# Patient Record
Sex: FEMALE | Race: OTHER
[De-identification: names, ages, dates, MRNs, and addresses within clinical notes are randomized per-mention and may not be internally consistent; named-entity substitution may affect disease eponyms.]

---

## 2019-03-12 ENCOUNTER — HOSPITAL ENCOUNTER (INPATIENT)
Dept: HOSPITAL 88 - ER | Age: 84
LOS: 10 days | Discharge: NURSING, REHAB OR LONG-TERM CARE | DRG: 871 | End: 2019-03-22
Attending: INTERNAL MEDICINE | Admitting: INTERNAL MEDICINE
Payer: MEDICARE

## 2019-03-12 VITALS — DIASTOLIC BLOOD PRESSURE: 64 MMHG | SYSTOLIC BLOOD PRESSURE: 152 MMHG

## 2019-03-12 VITALS — SYSTOLIC BLOOD PRESSURE: 152 MMHG | DIASTOLIC BLOOD PRESSURE: 64 MMHG

## 2019-03-12 VITALS — DIASTOLIC BLOOD PRESSURE: 77 MMHG | SYSTOLIC BLOOD PRESSURE: 143 MMHG

## 2019-03-12 DIAGNOSIS — Z74.01: ICD-10-CM

## 2019-03-12 DIAGNOSIS — K44.9: ICD-10-CM

## 2019-03-12 DIAGNOSIS — E87.2: ICD-10-CM

## 2019-03-12 DIAGNOSIS — E44.0: ICD-10-CM

## 2019-03-12 DIAGNOSIS — N39.0: ICD-10-CM

## 2019-03-12 DIAGNOSIS — Z88.0: ICD-10-CM

## 2019-03-12 DIAGNOSIS — Z88.2: ICD-10-CM

## 2019-03-12 DIAGNOSIS — R62.7: ICD-10-CM

## 2019-03-12 DIAGNOSIS — F02.80: ICD-10-CM

## 2019-03-12 DIAGNOSIS — K29.70: ICD-10-CM

## 2019-03-12 DIAGNOSIS — Z43.1: ICD-10-CM

## 2019-03-12 DIAGNOSIS — Z79.01: ICD-10-CM

## 2019-03-12 DIAGNOSIS — Z86.73: ICD-10-CM

## 2019-03-12 DIAGNOSIS — G30.9: ICD-10-CM

## 2019-03-12 DIAGNOSIS — A41.9: Primary | ICD-10-CM

## 2019-03-12 DIAGNOSIS — I48.91: ICD-10-CM

## 2019-03-12 DIAGNOSIS — Z91.012: ICD-10-CM

## 2019-03-12 DIAGNOSIS — J69.0: ICD-10-CM

## 2019-03-12 LAB
ALBUMIN SERPL-MCNC: 2.9 G/DL (ref 3.5–5)
ALBUMIN/GLOB SERPL: 0.9 {RATIO} (ref 0.8–2)
ALP SERPL-CCNC: 66 IU/L (ref 40–150)
ALT SERPL-CCNC: 13 IU/L (ref 0–55)
ANION GAP SERPL CALC-SCNC: 18.4 MMOL/L (ref 8–16)
BACTERIA URNS QL MICRO: (no result) /HPF
BASOPHILS # BLD AUTO: 0.1 10*3/UL (ref 0–0.1)
BASOPHILS NFR BLD AUTO: 0.4 % (ref 0–1)
BILIRUB UR QL: NEGATIVE
BUN SERPL-MCNC: 28 MG/DL (ref 7–26)
BUN/CREAT SERPL: 24 (ref 6–25)
CALCIUM SERPL-MCNC: 9.2 MG/DL (ref 8.4–10.2)
CHLORIDE SERPL-SCNC: 98 MMOL/L (ref 98–107)
CK MB SERPL-MCNC: 0.3 NG/ML (ref 0–5)
CK SERPL-CCNC: 44 IU/L (ref 29–168)
CLARITY UR: (no result)
CO2 SERPL-SCNC: 24 MMOL/L (ref 22–29)
COLOR UR: (no result)
DEPRECATED NEUTROPHILS # BLD AUTO: 14.7 10*3/UL (ref 2.1–6.9)
DEPRECATED RBC URNS MANUAL-ACNC: (no result) /HPF (ref 0–5)
EGFRCR SERPLBLD CKD-EPI 2021: 43 ML/MIN (ref 60–?)
EOSINOPHIL # BLD AUTO: 0 10*3/UL (ref 0–0.4)
EOSINOPHIL NFR BLD AUTO: 0.2 % (ref 0–6)
EPI CELLS URNS QL MICRO: (no result) /LPF
ERYTHROCYTE [DISTWIDTH] IN CORD BLOOD: 13.5 % (ref 11.7–14.4)
GLOBULIN PLAS-MCNC: 3.2 G/DL (ref 2.3–3.5)
GLUCOSE SERPLBLD-MCNC: 200 MG/DL (ref 74–118)
HCT VFR BLD AUTO: 41.7 % (ref 34.2–44.1)
HGB BLD-MCNC: 13.4 G/DL (ref 12–16)
KETONES UR QL STRIP.AUTO: NEGATIVE
LEUKOCYTE ESTERASE UR QL STRIP.AUTO: (no result)
LYMPHOCYTES # BLD: 0.4 10*3/UL (ref 1–3.2)
LYMPHOCYTES NFR BLD AUTO: 2.7 % (ref 18–39.1)
MCH RBC QN AUTO: 30.9 PG (ref 28–32)
MCHC RBC AUTO-ENTMCNC: 32.1 G/DL (ref 31–35)
MCV RBC AUTO: 96.1 FL (ref 81–99)
MONOCYTES # BLD AUTO: 0.9 10*3/UL (ref 0.2–0.8)
MONOCYTES NFR BLD AUTO: 5.2 % (ref 4.4–11.3)
NEUTS SEG NFR BLD AUTO: 90.8 % (ref 38.7–80)
NITRITE UR QL STRIP.AUTO: NEGATIVE
NON-SQ EPI CELLS URNS QL MICRO: (no result)
PLATELET # BLD AUTO: 226 X10E3/UL (ref 140–360)
POTASSIUM SERPL-SCNC: 3.4 MMOL/L (ref 3.5–5.1)
PROT UR QL STRIP.AUTO: NEGATIVE
RBC # BLD AUTO: 4.34 X10E6/UL (ref 3.6–5.1)
SODIUM SERPL-SCNC: 137 MMOL/L (ref 136–145)
SP GR UR STRIP: 1.01 (ref 1.01–1.02)
UROBILINOGEN UR STRIP-MCNC: 0.2 MG/DL (ref 0.2–1)
WBC #/AREA URNS HPF: (no result) /HPF (ref 0–5)

## 2019-03-12 PROCEDURE — 97139 UNLISTED THERAPEUTIC PX: CPT

## 2019-03-12 PROCEDURE — 93005 ELECTROCARDIOGRAM TRACING: CPT

## 2019-03-12 PROCEDURE — 84484 ASSAY OF TROPONIN QUANT: CPT

## 2019-03-12 PROCEDURE — 82553 CREATINE MB FRACTION: CPT

## 2019-03-12 PROCEDURE — 87086 URINE CULTURE/COLONY COUNT: CPT

## 2019-03-12 PROCEDURE — 74230 X-RAY XM SWLNG FUNCJ C+: CPT

## 2019-03-12 PROCEDURE — 87186 SC STD MICRODIL/AGAR DIL: CPT

## 2019-03-12 PROCEDURE — 99284 EMERGENCY DEPT VISIT MOD MDM: CPT

## 2019-03-12 PROCEDURE — 83605 ASSAY OF LACTIC ACID: CPT

## 2019-03-12 PROCEDURE — 82550 ASSAY OF CK (CPK): CPT

## 2019-03-12 PROCEDURE — 81001 URINALYSIS AUTO W/SCOPE: CPT

## 2019-03-12 PROCEDURE — 36415 COLL VENOUS BLD VENIPUNCTURE: CPT

## 2019-03-12 PROCEDURE — 71045 X-RAY EXAM CHEST 1 VIEW: CPT

## 2019-03-12 PROCEDURE — 87040 BLOOD CULTURE FOR BACTERIA: CPT

## 2019-03-12 PROCEDURE — 80053 COMPREHEN METABOLIC PANEL: CPT

## 2019-03-12 PROCEDURE — 87070 CULTURE OTHR SPECIMN AEROBIC: CPT

## 2019-03-12 PROCEDURE — 70450 CT HEAD/BRAIN W/O DYE: CPT

## 2019-03-12 PROCEDURE — 85025 COMPLETE CBC W/AUTO DIFF WBC: CPT

## 2019-03-12 PROCEDURE — 80048 BASIC METABOLIC PNL TOTAL CA: CPT

## 2019-03-12 PROCEDURE — 87205 SMEAR GRAM STAIN: CPT

## 2019-03-12 PROCEDURE — 43246 EGD PLACE GASTROSTOMY TUBE: CPT

## 2019-03-12 PROCEDURE — 83735 ASSAY OF MAGNESIUM: CPT

## 2019-03-12 PROCEDURE — 94640 AIRWAY INHALATION TREATMENT: CPT

## 2019-03-12 PROCEDURE — 82948 REAGENT STRIP/BLOOD GLUCOSE: CPT

## 2019-03-12 RX ADMIN — Medication SCH ML: at 23:00

## 2019-03-12 RX ADMIN — CLINDAMYCIN PHOSPHATE SCH MLS/HR: 18 INJECTION, SOLUTION INTRAVENOUS at 00:55

## 2019-03-12 NOTE — NUR
DIDI FROM LAB CALLED TO REPORT CRITICAL LACTIC ACID. INFORMED DR. ARNOLD AS 
WELL AS DANIEL RN PRIMARY NURSE.

## 2019-03-12 NOTE — NUR
RECEIVED ORDERS FOR ONE MORE LITER BOLUS R/T LACTIC = 53.9 AND TO REPEAT LACTIC 
EVERY 4 HOURS UNTIL NORMAL.  ALSO ORDER RECEIVED TO CHANGE PTS STATUS TO ICU.  
SUPERVISOR NOTIFIED.

## 2019-03-12 NOTE — XMS REPORT
Clinical Summary

                             Created on: 2019



Vianca Jarquin

External Reference #: [id number]

: 1935

Sex: Female



Demographics







                          Address                   [address] 

[place]  [address]

 

                          Home Phone                [phone]

 

                          Preferred Language        English

 

                          Marital Status            

 

                          Temple Affiliation     Unknown

 

                          Race                      White

 

                          Ethnic Group              Non-





Author







                          Author                    [facility]

 

                          Organization              [facility]

 

                          Address                   Unknown

 

                          Phone                     Unavailable







Support







                Name            Relationship    Address         Phone

 

                BackYasmany dunham    [employer]            Unknown         [phone]

 

                Michael Jarquin    [employer]            Unknown         [phone]







Care Team Providers







                    Care Team Member Name    Role                Phone

 

                    Michael Edmond MD    PCP                 [phone]







Allergies







                                        Comments



                 Active Allergy     Reactions       Severity        Noted Date 

 

                                         



                           Lactase                   2018 

 

                                         



                           Egg                       2018 

 

                                         



                           Penicillin G              2018 

 

                                         



                           Sulfa (Sulfonamide        2018 



                                         Antibiotics)    







Medications







                          End Date                  Status



              Medication     Sig          Dispensed     Refills      Start  



                                         Date  

 

                                                    Active



                     bisacodyl (DULCOLAX) 10     Insert 10 mg        0   



                           mg suppository            into the     



                                         rectum daily     



                                         as needed for     



                                         constipation.     

 

                                                    Active



                     lactulose 10 gram/15 mL     Take 20 g by        0   



                           (15 mL) solution          mouth daily.     

 

                                                    Active



                     multivitamin with     Take 1 tablet       0   



                           minerals tablet           by mouth     



                                         daily.     

 

                                                    Active



                     levothyroxine (SYNTHROID,     Take 100 mcg        0   



                           LEVOXYL) 100 mcg tablet     by mouth     



                                         daily.     

 

                                                    Active



                     acetaminophen (TYLENOL)     Take 650 mg         0   



                           325 MG tablet             by mouth     



                                         every 6 (six)     



                                         hours as     



                                         needed for     



                                         fever.     

 

                          2018                Discontinued



                     albuterol (ACCUNEB) 2.5     Take 2.5 mg         0   



                           mg /3 mL (0.083 %)        by     



                           nebulizer solution        nebulization     



                                         every 6 (six)     



                                         hours as     



                                         needed for     



                                         wheezing.     

 

                          2018                Discontinued



                     thiamine 100 MG tablet     Take 100 mg         0   



                                         by mouth     



                                         daily.     

 

                          2018                



              metoprolol tartrate     1 tablet (25     60 tablet     0              



                     (LOPRESSOR) 25 mg tablet     mg total) by        8  



                                         g-tube route     



                                         2 (two) times     



                                         a day for 30     



                                         days.     

 

                          2018                



              rivaroxaban (XARELTO) 15     Take 1 tablet     30 tablet     0              



                     mg tablet           (15 mg total)       8  



                                         by mouth     



                                         daily for 30     



                                         days.     

 

                          2018                



              predniSONE (DELTASONE) 10     Take 1 tablet     30 tablet     0              



                     mg tablet           (10 mg total)       8  



                                         by mouth     



                                         daily for 30     



                                         days.     

 

                          2018                



              nortriptyline (PAMELOR)     1 capsule (50     30 capsule     0              



                     50 MG capsule       mg total) by        8  



                                         g-tube route     



                                         nightly for     



                                         30 days.     







Active Problems







 



                           Problem                   Noted Date

 

 



                           Hypoxia                   2018

 

 



                           Altered mental status, unspecified     2018

 

 



                           Altered mental status     2018







Encounters







                          Care Team                 Description



                     Date                Type                Specialty  

 

                                        



Cherri Diaz MD Demmler, Michael RUCKER MD                 Hypoxia (Primary Dx); 

Sepsis, due to unspecified organism



                     2018          Hospital            General Internal Medicine  



                           -                         Encounter   



                                         2018    

 

                                        



Cherri Diaz MD                  PEG tube malfunction (Primary Dx); 

Generalized abdominal pain



                     2018          Emergency           Emergency Medicine  



after 2018



Social History







                                        Date



                 Tobacco Use     Types           Packs/Day       Years Used 

 

                                         



                                         Unknown If Ever Smoked    

 

    



                                         Smokeless Tobacco: Never   



                                         Used   









   



                 Alcohol Use     Drinks/Week     oz/Week         Comments

 

   



                                         No   









 



                           Sex Assigned at Birth     Date Recorded

 

 



                                         Not on file 









                                        Industry



                           Job Start Date            Occupation 

 

                                        Not on file



                           Not on file               Not on file 









                                        Travel End



                           Travel History            Travel Start 

 





                                         No recent travel history available.







Last Filed Vital Signs







                                        Time Taken



                           Vital Sign                Reading 

 

                                        2018 11:35 AM CDT



                           Blood Pressure            127/78 

 

                                        2018 11:35 AM CDT



                           Pulse                     110 

 

                                        2018 11:35 AM CDT



                           Temperature               36.7 C (98.1 F) 

 

                                        2018 11:35 AM CDT



                           Respiratory Rate          18 

 

                                        2018 11:35 AM CDT



                           Oxygen Saturation         100% 

 

                                        -



                           Inhaled Oxygen            - 



                                         Concentration  

 

                                        2018  9:17 AM CDT



                           Weight                    45.4 kg (100 lb) 

 

                                        2018  8:13 AM CDT



                           Height                    157.5 cm (5' 2") 

 

                                        2018  9:17 AM CDT



                           Body Mass Index           18.29 







Plan of Treatment







   



                 Health Maintenance     Due Date        Last Done       Comments

 

   



                           SHINGLES VACCINES (#1)     1986  

 

   



                           65+ PNEUMOCOCCAL VACCINE     2001  



                                         (1 of 2 - PCV13)   

 

   



                           PNEUMOCOCCAL              2001  



                                         POLYSACCHARIDE VACCINE   



                                         AGE 65 AND OVER   

 

   



                           INFLUENZA VACCINE         2018  







Procedures







                                        Comments



                 Procedure Name     Priority        Date/Time       Associated Diagnosis 

 

                                        



Results for this procedure are in the results section.



                     POC GLUCOSE         Routine             2018  



                                         2:21 PM CDT  

 

                                        



Results for this procedure are in the results section.



                     POC GLUCOSE         Routine             2018  



                                         9:50 AM CDT  

 

                                        



Results for this procedure are in the results section.



                     XR CHEST 1 VW PORTABLE     Routine             2018  



                                         9:45 AM CDT  

 

                                        



Results for this procedure are in the results section.



                     ZZESTIMATED GFR     Routine             2018  



                                         7:40 AM CDT  

 

                                        



Results for this procedure are in the results section.



                     PHOSPHORUS LEVEL     Routine             2018  



                                         7:40 AM CDT  

 

                                        



Results for this procedure are in the results section.



                     HC COMPLETE BLD COUNT     Routine             2018  



                           W/AUTO DIFF               7:40 AM CDT  

 

                                        



Results for this procedure are in the results section.



                     BASIC METABOLIC PANEL     Routine             2018  



                                         7:40 AM CDT  

 

                                        



Results for this procedure are in the results section.



                     POC GLUCOSE         Routine             2018  



                                         5:18 AM CDT  

 

                                        



Results for this procedure are in the results section.



                     POC GLUCOSE         Routine             2018  



                                         1:33 AM CDT  

 

                                        



Results for this procedure are in the results section.



                     POC GLUCOSE         Routine             2018  



                                         9:30 PM CDT  

 

                                        



Results for this procedure are in the results section.



                     POC GLUCOSE         Routine             2018  



                                         4:19 PM CDT  

 

                                        



Results for this procedure are in the results section.



                     POC GLUCOSE         Routine             2018  



                                         1:12 PM CDT  

 

                                        



Results for this procedure are in the results section.



                     POC GLUCOSE         Routine             2018  



                                         8:59 AM CDT  

 

                                        



Results for this procedure are in the results section.



                     XR CHEST 1 VW PORTABLE     Routine             2018  



                                         6:40 AM CDT  

 

                                        



Results for this procedure are in the results section.



                     ARTERIAL BLOOD GAS     Routine             2018  



                                         5:01 AM CDT  

 

                                        



Results for this procedure are in the results section.



                     ZZESTIMATED GFR     Routine             2018  



                                         3:20 AM CDT  

 

                                        



Results for this procedure are in the results section.



                     LACTIC ACID LEVEL     Routine             2018  



                                         3:20 AM CDT  

 

                                        



Results for this procedure are in the results section.



                     PHOSPHORUS LEVEL     Routine             2018  



                                         3:20 AM CDT  

 

                                        



Results for this procedure are in the results section.



                     HC COMPLETE BLD COUNT     Routine             2018  



                           W/AUTO DIFF               3:20 AM CDT  

 

                                        



Results for this procedure are in the results section.



                     BASIC METABOLIC PANEL     Routine             2018  



                                         3:20 AM CDT  

 

                                        



Results for this procedure are in the results section.



                     POC GLUCOSE         Routine             2018  



                                         8:52 PM CDT  

 

                                        



Results for this procedure are in the results section.



                     LACTIC ACID LEVEL     Timed               2018  



                                         6:37 PM CDT  

 

                                        



Results for this procedure are in the results section.



                     POC GLUCOSE         Routine             2018  



                                         3:49 PM CDT  

 

                                        



Results for this procedure are in the results section.



                     LACTIC ACID LEVEL     Timed               2018  



                                         2:55 PM CDT  

 

                                        



Results for this procedure are in the results section.



                     POC GLUCOSE         Routine             2018  



                                         11:47 AM CDT  

 

                                        



Results for this procedure are in the results section.



                     LACTIC ACID LEVEL     Timed               2018  



                                         8:37 AM CDT  

 

                                        



Results for this procedure are in the results section.



                     POC GLUCOSE         Routine             2018  



                                         7:20 AM CDT  

 

                                        



Results for this procedure are in the results section.



                     XR CHEST 1 VW PORTABLE     Routine             2018  



                                         6:29 AM CDT  

 

                                        



Results for this procedure are in the results section.



                     POC GLUCOSE         Routine             2018  



                                         4:59 AM CDT  

 

                                        



Results for this procedure are in the results section.



                     PHOSPHORUS LEVEL     Routine             2018  



                                         4:16 AM CDT  

 

                                        



Results for this procedure are in the results section.



                     ZZESTIMATED GFR     Routine             2018  



                                         4:16 AM CDT  

 

                                        



Results for this procedure are in the results section.



                     MAGNESIUM LEVEL     Routine             2018  



                                         4:16 AM CDT  

 

                                        



Results for this procedure are in the results section.



                     HC COMPLETE BLD COUNT     Routine             2018  



                           W/AUTO DIFF               4:16 AM CDT  

 

                                        



Results for this procedure are in the results section.



                     BASIC METABOLIC PANEL     Routine             2018  



                                         4:16 AM CDT  

 

                                        



Results for this procedure are in the results section.



                     HEPATIC FUNCTION PANEL     Routine             2018  



                                         4:16 AM CDT  

 

                                        



Results for this procedure are in the results section.



                     ARTERIAL BLOOD GAS     Routine             2018  



                                         4:05 AM CDT  

 

                                        



Results for this procedure are in the results section.



                     POC GLUCOSE         Routine             2018  



                                         1:54 AM CDT  

 

                                        



Results for this procedure are in the results section.



                     ZZESTIMATED GFR     Timed               2018  



                                         8:56 PM CDT  

 

                                        



Results for this procedure are in the results section.



                     BASIC METABOLIC PANEL     Timed               2018  



                                         8:56 PM CDT  

 

                                        



Results for this procedure are in the results section.



                     TROPONIN            Timed               2018  



                                         8:56 PM CDT  

 

                                        



Results for this procedure are in the results section.



                     LACTIC ACID LEVEL     Timed               2018  



                                         8:56 PM CDT  

 

                                        



Results for this procedure are in the results section.



                     POC GLUCOSE         Routine             2018  



                                         8:49 PM CDT  

 

                                        



Results for this procedure are in the results section.



                     RESPIRATORY PATHOGEN     Routine             2018  



                           PANEL                     6:05 PM CDT  

 

                                        



Results for this procedure are in the results section.



                     POC GLUCOSE         Routine             2018  



                                         4:08 PM CDT  

 

                                        



Results for this procedure are in the results section.



                     LACTIC ACID LEVEL, SEPSIS     Timed               2018  



                           - NOW AND REPEAT 2X EVERY      2:40 PM CDT  



                                         3 HOURS    

 

                                        



Results for this procedure are in the results section.



                     TROPONIN            Timed               2018  



                                         2:40 PM CDT  

 

                                        



Results for this procedure are in the results section.



                     ECHOCARDIOGRAM 2D     Routine             2018  



                           COMPLETE W MMODE SPECTRAL      1:56 PM CDT  



                                         COLOR DOPPLER (33970)    

 

                                        



Results for this procedure are in the results section.



                     POC GLUCOSE         Routine             2018  



                                         12:34 PM CDT  

 

                                        



Results for this procedure are in the results section.



                     CT ANGIOGRAM PE CHEST     STAT                2018  



                                         12:11 PM CDT  

 

                                        



Results for this procedure are in the results section.



                     CT HEAD WO CONTRAST     STAT                2018  



                                         12:11 PM CDT  

 

                                        



Results for this procedure are in the results section.



                     ARTERIAL BLOOD GAS     Routine             2018  



                                         10:40 AM CDT  

 

                                        



Results for this procedure are in the results section.



                     BLOOD CULTURE, AEROBIC &     Routine             2018  



                           ANAEROBIC                 9:56 AM CDT  

 

                                        



Results for this procedure are in the results section.



                     XR CHEST 1 VW PORTABLE     STAT                2018  



                                         9:54 AM CDT  

 

                                        



Results for this procedure are in the results section.



                     ZZESTIMATED GFR     STAT                2018  



                                         9:51 AM CDT  

 

                                        



Results for this procedure are in the results section.



                     B NATRIURETIC PEPTIDE     STAT                2018  



                                         9:51 AM CDT  

 

                                        



Results for this procedure are in the results section.



                     TROPONIN            STAT                2018  



                                         9:51 AM CDT  

 

                                        



Results for this procedure are in the results section.



                     PROTHROMBIN TIME WITH INR     STAT                2018  



                                         9:51 AM CDT  

 

                                        



Results for this procedure are in the results section.



                     LACTIC ACID LEVEL, SEPSIS     STAT                2018  



                           - NOW AND REPEAT 2X EVERY      9:51 AM CDT  



                                         3 HOURS    

 

                                        



Results for this procedure are in the results section.



                     COMPREHENSIVE METABOLIC     STAT                2018  



                           PANEL                     9:51 AM CDT  

 

                                        



Results for this procedure are in the results section.



                     CBC WITH PLATELET AND     STAT                2018  



                           DIFFERENTIAL              9:51 AM CDT  

 

                                        



Results for this procedure are in the results section.



                     BLOOD CULTURE, AEROBIC &     Routine             2018  



                           ANAEROBIC                 9:51 AM CDT  

 

                                        



Results for this procedure are in the results section.



                     URINALYSIS SCREEN AND     STAT                2018  



                           MICROSCOPY, WITH REFLEX      9:50 AM CDT  



                                         TO CULTURE    

 

                                        



Results for this procedure are in the results section.



                     GRAM STAIN          STAT                2018  



                                         9:50 AM CDT  

 

                                        



Results for this procedure are in the results section.



                     URINE CULTURE       STAT                2018  



                                         9:50 AM CDT  

 

                                        



Results for this procedure are in the results section.



                     ECG ED PRELIMINARY     Routine             2018  



                           INTERPRETATION            9:41 AM CDT  

 

                                        



Results for this procedure are in the results section.



                     ECG 12-LEAD         STAT                2018  



                                         9:38 AM CDT  

 

                                        



Results for this procedure are in the results section.



                     INTUBATION          Routine             2018  



                                         8:58 AM CDT  

 

                                        



Results for this procedure are in the results section.



                     XR ABDOMEN 1 VW PORTABLE     STAT                2018  



                                         8:51 AM CDT  

 

                                        



Results for this procedure are in the results section.



                     LA CHANGE GASTROSTOMY     Routine             2018  



                           TUBE PERCUTANEOUS W/O      8:13 AM CDT  



                                         GUIDE    



after 2018



Results

* POC glucose (2018  2:21 PM CDT)



Only the most recent of 17 results within the time period is included.





   



                 Component       Value           Ref Range       Performed At

 

   



                 POC glucose     217 (H)         65 - 100 mg/dL     [facility] DEPARTMENT OF



                           Comment:                  PATHOLOGY AND



                           Meter ID: [id number]      GENOMIC MEDICINE



                                         : Dee Zapien  









   



                 Performing Organization     Address         City/State/Zipcode     Phone Number

 

   



                     [facility] DEPARTMENT OF     [address] [place] [address] 



                                         PATHOLOGY AND GENOMIC   



                                         MEDICINE   





* XR Chest 1 Vw Portable (2018  9:45 AM CDT)



Only the most recent of 4 results within the time period is included.





 



                           Narrative                 Performed At

 

 



                           EXAMINATION: XR CHEST 1 VW PORTABLE     [facility]



                                         INDICATION: Respiratory Failure or Arrest 



                                         COMPARISON: Most recent prior. Prior chest and abdominal CTs. 



                                         IMPRESSION: 



                                         Stable appearance of the heart and mediastinum. 



                                         Arteriosclerosis and tortuous thoracic aorta. 



                                         Hypoventilation of the lungs with bibasilar opacities likely representing 



                                         atelectasis. 



                                         Stable calcified granuloma right lower lobe. 



                                         No visible effusion or pneumothorax. 



                                         Stable calcified mass in the left upper quadrant is consistent with a chronic 



                                         left adrenal hematoma. 



                                         [facility]-[id number] 









                                        Procedure Note

 

                                        



Hm Interface, Radiology Results Incoming - 2018  9:53 AM CDT



EXAMINATION: XR CHEST 1 VW PORTABLE



INDICATION: Respiratory Failure or Arrest



COMPARISON: Most recent prior. Prior chest and abdominal CTs.



IMPRESSION:



Stable appearance of the heart and mediastinum.

Arteriosclerosis and tortuous thoracic aorta.

Hypoventilation of the lungs with bibasilar opacities likely representing 
atelectasis.

Stable calcified granuloma right lower lobe.

No visible effusion or pneumothorax.

Stable calcified mass in the left upper quadrant is consistent with a chronic 
left adrenal hematoma.



[facility]-[id number]











   



                 Performing Organization     Address         City/[facility]/Zipcode     Phone Number

 

   



                     [facility]          [address] [place] [address] 





* Estimated GFR (2018  7:40 AM CDT)



Only the most recent of 5 results within the time period is included.





   



                 Component       Value           Ref Range       Performed At

 

   



                 GFR Non Af Amer     >90             mL/min/1.73 m2     [facility] DEPARTMENT OF



                                         PATHOLOGY AND



                                         GENOMIC MEDICINE

 

   



                 GFR Af Amer     >90             mL/min/1.73 m2     [facility] DEPARTMENT OF



                           Comment:                  PATHOLOGY AND



                           Chronic kidney disease: <60      GENOMIC MEDICINE



                                         mL/min/1.73m2  



                                         Kidney failure: <15  



                                         mL/min/1.73m2  



                                         The estimated GFR is  



                                         calculated from the  



                                         IDMS-traceable Modification of  



                                         Diet  



                                         in Renal Disease Equation. The  



                                         accuracy of the calculation is  



                                         poor when the  



                                         creatinine is normal.  



                                         Calculated values >90  



                                         mL/min/1.73m2 are not  



                                         reported.  



                                         This equation has not been  



                                         validated in children (<18  



                                         years), pregnant  



                                         women, the elderly (>70  



                                         years), or ethnic groups other  



                                         than Caucasians and  



                                          Americans.  













                                         Specimen

 





                                         Plasma specimen









   



                 Performing Organization     Address         City/State/Zipcode     Phone Number

 

   



                     [facility] DEPARTMENT [address] [place] [address] 



                                         PATHOLOGY AND GENOMIC   



                                         MEDICINE   





* CBC with platelet and differential (2018  7:40 AM CDT)



Only the most recent of 4 results within the time period is included.





   



                 Component       Value           Ref Range       Performed At

 

   



                 WBC             7.4             4.2 - 11.0 k/uL     [facility] DEPARTMENT OF



                                         PATHOLOGY AND



                                         GENOMIC MEDICINE

 

   



                 RBC             4.27            4.04 - 5.86 m/uL     [facility] DEPARTMENT OF



                                         PATHOLOGY AND



                                         GENOMIC MEDICINE

 

   



                 HGB             12.0            11.5 - 15.3 g/dL     [facility] DEPARTMENT OF



                                         PATHOLOGY AND



                                         GENOMIC MEDICINE

 

   



                 HCT             37.5            34.0 - 45.0 %     [facility] DEPARTMENT OF



                                         PATHOLOGY AND



                                         GENOMIC MEDICINE

 

   



                 MCV             87.8            80.0 - 98.0 fL     [facility] DEPARTMENT OF



                                         PATHOLOGY AND



                                         GENOMIC MEDICINE

 

   



                 MCH             28.1            27.0 - 34.0 pg     [facility] DEPARTMENT OF



                                         PATHOLOGY AND



                                         GENOMIC MEDICINE

 

   



                 MCHC            32.0            31.5 - 36.5 g/dL     [facility] DEPARTMENT OF



                                         PATHOLOGY AND



                                         GENOMIC MEDICINE

 

   



                 RDW - SD        52.9 (H)        37.0 - 51.0 fL     [facility] DEPARTMENT OF



                                         PATHOLOGY AND



                                         GENOMIC MEDICINE

 

   



                 MPV             10.5 (H)        7.4 - 10.4 fL     [facility] DEPARTMENT OF



                                         PATHOLOGY AND



                                         GENOMIC MEDICINE

 

   



                 Platelet count     231             150 - 400 k/uL     [facility] DEPARTMENT 



                                         PATHOLOGY AND



                                         GENOMIC MEDICINE

 

   



                 Nucleated RBC     0.00            /100 WBC        [facility] DEPARTMENT OF



                                         PATHOLOGY AND



                                         GENOMIC MEDICINE

 

   



                 Neutrophils     76.0 (H)        36.0 - 66.0 %     [facility] DEPARTMENT OF



                                         PATHOLOGY AND



                                         GENOMIC MEDICINE

 

   



                 Lymphocytes     15.0 (L)        24.0 - 44.0 %     [facility] DEPARTMENT OF



                                         PATHOLOGY AND



                                         GENOMIC MEDICINE

 

   



                 Monocytes       7.8 (H)         0.0 - 6.0 %     [facility] DEPARTMENT OF



                                         PATHOLOGY AND



                                         GENOMIC MEDICINE

 

   



                 Eosinophils     0.5             0.0 - 6.0 %     [facility] DEPARTMENT 



                                         PATHOLOGY AND



                                         GENOMIC MEDICINE

 

   



                 Basophils       0.4             0.0 - 1.2 %     [facility]



                                         PATHOLOGY AND



                                         GENOMIC MEDICINE

 

   



                 Immature granulocytes     0.3             0.0 - 1.0 %     [facility] DEPARTMENT OF



                                         PATHOLOGY AND



                                         GENOMIC MEDICINE













                                         Specimen

 





                                         Blood









   



                 Performing Organization     Address         City/[facility]     Phone Number

 

   



                     [place] [address] 



                                         PATHOLOGY [facility]   





* Phosphorus level (2018  7:40 AM CDT)



Only the most recent of 3 results within the time period is included.





   



                 Component       Value           Ref Range       Performed At

 

   



                 Phosphorus      1.8 (L)         2.4 - 4.5 mg/dL     [facility]



                                         PATHOLOGY AND



                                         GENOMIC MEDICINE













                                         Specimen

 





                                         Plasma specimen









   



                 Performing Organization     Address         City/[facility] Cent[facility]     Phone Number

 

   



                     [place] [address] 



                                         PATHOLOGY [facility]   





* Basic metabolic panel (2018  7:40 AM CDT)



Only the most recent of 4 results within the time period is included.





   



                 Component       Value           Ref Range       Performed At

 

   



                 Sodium          138             135 - 150 mEq/L     [facility] DEPARTMENT OF



                                         PATHOLOGY AND



                                         GENOMIC MEDICINE

 

   



                 Potassium       4.1             3.5 - 5.0 mEq/L     [facility] DEPARTMENT OF



                                         PATHOLOGY AND



                                         GENOMIC MEDICINE

 

   



                 Chloride        100             98 - 112 mEq/L     [facility] DEPARTMENT OF



                                         PATHOLOGY AND



                                         GENOMIC MEDICINE

 

   



                 CO2             24              24 - 31 mmol/L     [facility] DEPARTMENT OF



                                         PATHOLOGY AND



                                         GENOMIC MEDICINE

 

   



                 Anion gap       14@ANIO         7 - 15 mEq/L     [facility] DEPARTMENT OF



                                         PATHOLOGY AND



                                         GENOMIC MEDICINE

 

   



                 BUN             21 (H)          7 - 18 mg/dL     [facility] DEPARTMENT OF



                                         PATHOLOGY AND



                                         GENOMIC MEDICINE

 

   



                 Creatinine      0.60            0.50 - 0.90 mg/dL     [facility] DEPARTMENT OF



                                         PATHOLOGY AND



                                         GENOMIC MEDICINE

 

   



                 Glucose         168 (H)         65 - 100 mg/dL     [facility] DEPARTMENT OF



                                         PATHOLOGY AND



                                         GENOMIC MEDICINE

 

   



                 Calcium         9.6             8.8 - 10.2 mg/dL     [facility] DEPARTMENT OF



                                         PATHOLOGY AND



                                         GENOMIC MEDICINE













                                         Specimen

 





                                         Plasma specimen









   



                 Performing Organization     Address         City/State/Zipcode     Phone Number

 

   



                     [place] [address] [place] [address] 



                                         PATHOLOGY AND GENOMIC   



                                         MEDICINE   





* Arterial blood gas (2018  5:01 AM CDT)



Only the most recent of 3 results within the time period is included.





   



                 Component       Value           Ref Range       Performed At

 

   



                                 JSXB                [facility] DEPARTMENT OF



                                         PATHOLOGY AND



                                         GENOMIC MEDICINE

 

   



                     Collection site     LRA                 [facility] DEPARTMENT OF



                                         PATHOLOGY AND



                                         GENOMIC MEDICINE

 

   



                     O2 therapy          HFNC                [facility] DEPARTMENT OF



                                         PATHOLOGY AND



                                         GENOMIC MEDICINE

 

   



                 pH, arterial     7.580 (HH)      7.350 - 7.450 units     [facility] DEPARTMENT OF



                           Comment:                  PATHOLOGY AND



                           Results called to and read      GENOMIC MEDICINE



                                         back by AYE JUDGE  



                                         [id number] by  



                                         _IV_.  

 

   



                 pCO2, arterial     24.8 (LL)       35.0 - 45.0 mmHg     [facility] DEPARTMENT OF



                           Comment:                  PATHOLOGY AND



                           Results called to and read      GENOMIC MEDICINE



                                         back by AYE JUDGE  



                                         [id number] by  



                                         _IV_.  

 

   



                 pO2, arterial     176.0 (H)       80.0 - 90.0 mmHg     [facility] DEPARTMENT OF



                                         PATHOLOGY AND



                                         GENOMIC MEDICINE

 

   



                 O2 saturation, arterial     >100.0          95.0 - 100.0 %     [facility] DEPARTMENT OF



                                         PATHOLOGY AND



                                         GENOMIC MEDICINE

 

   



                 Base excess, arterial     1.3             mEq/L           [facility] DEPARTMENT OF



                                         PATHOLOGY AND



                                         GENOMIC MEDICINE

 

   



                 Bicarbonate     23.2            21.0 - 28.0 mEq/L     [facility] DEPARTMENT OF



                                         PATHOLOGY AND



                                         GENOMIC MEDICINE

 

   



                 O2 content      17.1            VOL%            [facility] DEPARTMENT OF



                                         PATHOLOGY AND



                                         GENOMIC MEDICINE

 

   



                 FiO2, inspired O2%     32.0            %               [facility] DEPARTMENT OF



                                         PATHOLOGY AND



                                         GENOMIC MEDICINE

 

   



                 Inspired O2 l/m     3               L/min           [facility] DEPARTMENT OF



                                         PATHOLOGY AND



                                         GENOMIC MEDICINE

 

   



                 Carboxyhemoglobin     0.2             0.0 - 1.4 %     [facility] DEPARTMENT OF



                           Comment:                  PATHOLOGY AND



                           Reference Ranges:         GENOMIC MEDICINE



                                         Carboxyhemoglobin  



                                         Non smoker: 0.0 - 2.0%  



                                         Smoker: 2.1 - 5.0%  



                                         Heavy smoker: 5.1 - 9%  

 

   



                 Methemoglobin     <0.0            0.0 - 1.0 %     [facility] DEPARTMENT OF



                                         PATHOLOGY AND



                                         GENOMIC MEDICINE

 

   



                 Hemoglobin, blood gas     12.0            12.0 - 16.0 g/dL     [facility] DEPARTMENT OF



                                         PATHOLOGY AND



                                         GENOMIC MEDICINE

 

   



                 pO2, A-a        24.5            mmHg            [facility] DEPARTMENT OF



                                         PATHOLOGY AND



                                         GENOMIC MEDICINE













                                         Specimen

 





                                         Blood









   



                 Performing Organization     Address         City/[facility] Centercode     Phone Number

 

   



                     [place] [address] 



                                         PATHOLOGY AND GENOMIC   



                                         MEDICINE   





* Lactic acid level (2018  3:20 AM CDT)



Only the most recent of 5 results within the time period is included.





   



                 Component       Value           Ref Range       Performed At

 

   



                 Lactic acid     1.8             0.5 - 2.2 mmol/L     [facility] DEPARTMENT OF



                                         PATHOLOGY AND



                                         GENOMIC MEDICINE













                                         Specimen

 





                                         Blood









   



                 Performing Organization     Address         City/[facility] Centerc[facility]     Phone Number

 

   



                     [place] [address] 



                                         PATHOLOGY AND GENOMIC   



                                         MEDICINE   





* Magnesium level (2018  4:16 AM CDT)





   



                 Component       Value           Ref Range       Performed At

 

   



                 Magnesium       1.80            1.60 - 2.40 mg/dL     [facility] DEPARTMENT OF



                                         PATHOLOGY AND



                                         GENOMIC MEDICINE













                                         Specimen

 





                                         Plasma specimen









   



                 Performing Organization     Address         City/[facility]/[place]     Phone Number

 

   



                     [place] [address] 



                                         PATHOLOGY AND GENOMIC   



                                         MEDICINE   





* Hepatic function panel (2018  4:16 AM CDT)





   



                 Component       Value           Ref Range       Performed At

 

   



                 Albumin         2.6 (L)         3.5 - 5.0 g/dL     [facility] DEPARTMENT OF



                                         PATHOLOGY AND



                                         GENOMIC MEDICINE

 

   



                 Total bilirubin     0.4             0.2 - 1.2 mg/dL     [facility] DEPARTMENT OF



                                         PATHOLOGY AND



                                         GENOMIC MEDICINE

 

   



                 Bilirubin direct     <0.2            0.0 - 0.4 mg/dL     [facility] DEPARTMENT OF



                                         PATHOLOGY AND



                                         GENOMIC MEDICINE

 

   



                 Alkaline phosphatase     93              0 - 104 U/L     [facility] DEPARTMENT OF



                                         PATHOLOGY AND



                                         GENOMIC MEDICINE

 

   



                 Protein         6.8             6.3 - 8.3 g/dL     [facility] DEPARTMENT OF



                                         PATHOLOGY AND



                                         GENOMIC MEDICINE

 

   



                 ALT             15              5 - 50 U/L      [facility] DEPARTMENT OF



                                         PATHOLOGY AND



                                         GENOMIC MEDICINE

 

   



                 AST             32              10 - 35 U/L     [facility] DEPARTMENT OF



                                         PATHOLOGY AND



                                         GENOMIC MEDICINE













                                         Specimen

 





                                         Plasma specimen









   



                 Performing Organization     Address         City/[facility]/Zipcode     Phone Number

 

   



                     [address] [address] 



                                         PATHOLOGY AND GENOMIC   



                                         MEDICINE   





* Troponin (2018  8:56 PM CDT)



Only the most recent of 3 results within the time period is included.





   



                 Component       Value           Ref Range       Performed At

 

   



                 Troponin        <0.30           0.00 - 0.30 ng/mL     [facility] DEPARTMENT OF



                           Comment:                  PATHOLOGY AND



                           0.11 - 1.49               GENOMIC MEDICINE



                                         ng/mlMay  



                                         indicate increased risk of  



                                         acute  



                                           



                                          coronary  



                                         syndrome.  



                                           



                                           



                                         >=1.5  



                                         ng/ml  



                                         Consistent with acute  



                                         myocardial  



                                           



                                          infarction.  



                                           



                                           



                                           



                                           



                                           



                                         The diagnostic value of a  



                                         single normal or  



                                         non-diagnostic  



                                         result is  



                                         questionable.Serial  



                                         samples at 2-6 hour intervals  



                                         are required to rule out acute  



                                         myocardial  



                                         injury.  



                                           



                                           













                                         Specimen

 





                                         Plasma specimen









   



                 Performing Organization     Address         City/[facility]     Phone Number

 

   



                     [address] [place] [address] 



                                         PATHOLOGY AND GENOMIC   



                                         MEDICINE   





* Respiratory pathogen panel (2018  6:05 PM CDT)





   



                 Component       Value           Ref Range       Performed At

 

   



                     Respiratory pathogen     Positive for        [facility] DEPARTMENT OF



                     panel               Rhinovirus/Enterovirus      PATHOLOGY AND



                           -----------------------      GENOMIC MEDICINE



                                         Negative for all other  



                                         pathogens tested:  



                                         Negative for Adenovirus  



                                         Negative for Coronavirus HKU1  



                                         Negative for Coronavirus NL63  



                                         Negative for Coronavirus 229E  



                                         Negative for Coronavirus OC43  



                                         Negative for Human  



                                         Metapneumovirus  



                                         Negative for Influenza A  



                                         Negative for Influenza A/H1  



                                         Negative for Influenza A/H3  



                                         Negative for Influenza  



                                         A/H1-2009  



                                         Negative for Influenza B  



                                         Negative for Parainfluenza  



                                         Virus 1  



                                         Negative for Parainfluenza  



                                         Virus 2  



                                         Negative for Parainfluenza  



                                         Virus 3  



                                         Negative for Parainfluenza  



                                         Virus 4  



                                         Negative for Respiratory  



                                         Syncytial Virus  



                                         Negative for Bordetella  



                                         pertussis  



                                         Negative for Chlamydophila  



                                         pneumoniae  



                                         Negative for Mycoplasma  



                                         pneumoniae  



                                         This real-time PCR assay  



                                         detects the presence of  



                                         nucleic  



                                         acids (RNA or DNA) for the  



                                         respiratory pathogens  



                                         listed.  



                                         A result of "Not-detected"  



                                         does not exclude the  



                                         possibility  



                                         of the presence of one or more  



                                         pathogens at concentrations  



                                         less than the detectable  



                                         limits of the assa (A)  



                                         Comment:  



                                         Specimen Information  



                                         Specimen Source: Nares  



                                         Specimen Site: Right  













                                         Specimen

 





                                         Nares - Right









   



                 Performing Organization     Address         City/[facility]/Zipcode     Phone Number

 

   



                     [facility] DEPARTMENT OF     [address] [place] [address] 



                                         PATHOLOGY AND GENOMIC   



                                         MEDICINE   





* Lactic acid level, SEPSIS - Now and repeat 2x every 3 hours (2018  2:40 
  PM CDT)



Only the most recent of 2 results within the time period is included.





   



                 Component       Value           Ref Range       Performed At

 

   



                 Lactic acid     4.1 (HH)        0.5 - 2.2 mmol/L     [facility] DEPARTMENT OF



                           Comment:                  PATHOLOGY AND



                           Results called to and read      [facility] MEDICINE



                                         back by SOPHIA KWAN /  



                                         MEETA REYES NP  



                                         [id number] JGP  













                                         Specimen

 





                                         Blood









   



                 Performing Organization     Address         City/[facility]/Zipcode     Phone Number

 

   



                     [facility] DEPARTMENT OF     [address] [place] [address] 



                                         PATHOLOGY AND GENOMIC   



                                         MEDICINE   





* Echocardiogram complete w contrast and 3D if needed (2018  1:56 PM CDT)





   



                 Component       Value           Ref Range       Performed At

 

   



                 Velocity Ratio (V1/V2)     0.72            m/s             [facility]

 

   



                 IVS,d           1.15            0.6 - 1.2 cm     [facility]

 

   



                 EF              61.49           %               [facility]

 

   



                 LVPWD,d         1.09            cm              [facility]

 

   



                 AoV Mean PG     3.04            mmHg            [facility]

 

   



                 AV LVOT peak gradient     2.77            mmHg            [facility]

 

   



                 MV mean gradient     2.84            mmHg            [facility]

 

   



                 MV valve area p 1/2     6.90            cm2             [facility]



                                         method   

 

   



                 PV Pk Grad      3.60            mmHg            [facility]

 

   



                 E wave decelartion time     109.90          msec            [facility]

 

   



                 LVOT Diam,S     1.88            cm              [facility]

 

   



                 LVOT area       2.77            cm2             [facility]

 

   



                 LVOT Vmax       0.83            m/s             [facility]

 

   



                 LVOT VTI        0.14            m               [facility]

 

   



                 AoV Peak PG     5.27            mmHg            [facility]

 

   



                 MV Peak E Momo     1.15            m/s             [facility]

 

   



                 MV stenosis pressure 1/2     31.87           ms              [facility]



                                         time   

 

   



                 MV Peak A Momo     0.00            m/s             [facility]

 

   



                 Ao Root Diameter     2.84            cm              [facility]

 

   



                 AoV Area, Vmax     2.01            cm2             [facility]

 

   



                 AoV Area, VTI     1.79            cm2             [facility]

 

   



                 AoV Vmax        1.15            m/s             [facility]

 

   



                     IVS/LVPW,2D         1.06                [facility]

 

   



                 Left Atrium Dimension     3.19            cm              [facility]



                                         Anterior   

 

   



                 LV,d            3.88            cm              [facility]

 

   



                 LV,s            2.62            cm              [facility]

 

   



                 PV VMAX         0.95            m/s             [facility]

 

   



                 TR Vpeak        2.57            mm/s            [facility]

 

   



                 MV E A ratio     343.39          mmHg            [facility]

 

   



                 TR pk grad      23.92           mmHg            [facility]

 

   



                 MR peak grad     6.65            mmHg            [facility]

 

   



                 Ao Root Diameter     2.84            cm              [facility]

 

   



                 AR Press Half Time     482.24          ms              [facility]

 

   



                 LV SYS VOL      25.08           ml              [facility]

 

   



                 LV JASON VOL     65.13           ml              [facility]

 

   



                 LA area s A4C     10.30           cm2             [facility]

 

   



                 LA Vol MOD A4C     16.75           ml              [facility]

 

   



                 LV SV Teich 2D     40.05           ml              [facility]

 

   



                 LV Vol s Teich PSAX     25.08           ml              [facility]

 

   



                 LVOT CO         3.61            l/min           [facility]

 

   



                 LVOT HR for LVOT CO     92.19           bpm             [facility]

 

   



                 MV Vmax         1.29            m               [facility]

 

   



                 MV VTI Tips     0.18            m               [facility]

 

   



                     AoV Vmn             0.84                [facility]

 

   



                     AR slope            1.98                [facility]

 

   



                     Ar Vmax             3.30                [facility]

 

   



                     IVS s 2D            1.46                [facility]

 

   



                     LV FS Cube 2D       32.48               [facility]

 

   



                     LV FS Teich 2D      32.48               [facility]

 

   



                 AoV VTI         0.22            m               [facility]

 

   



                 LV EF,2D        69.22           %               [facility]

 

   



                     MV AE ratio         0.00                [facility]

 

   



                     LVOT Vmn            0.58                [facility]

 

   



                 Aov area Vmn     1.92            cm2             [facility]

 

   



                 LVOT mean grad     1.52            mmHg            [facility]

 

   



                     MAX Pred HR         138.13              [facility]

 

   



                     85 of MPHR          117.41              [facility]

 

   



                 AR DT           1,662.90        msec            [facility]

 

   



                 AR pk grad      43.51           mmHg            [facility]

 

   



                 Calc MPHR       138.13          bpm             [facility]

 

   



                 IVS pct thck PLAX     27.11           %               [facility]

 

   



                 LV SV Cube 2D     40.45           ml              [facility]

 

   



                 LV vol d cube 2D     58.44           ml              [facility]

 

   



                 LV vol s cube 2D     17.99           ml              [facility]

 

   



                 LVPW pct thck PLAX     32.48           %               [facility]

 

   



                 LVPW s PLAX     1.44            cm              [facility]

 

   



                 MV Decel slope     10.48           m/s2            [facility]

 

   



                     Pred Exer Dur R1     5.54                [facility]

 

   



                     Pred METS R1        4.06                [facility]









 



                           Narrative                 Performed At

 

 



                           This result has an attachment that is not available.     [facility]



                                          The left ventricle chamber size is normal. 



                                          Left Ventricular ejection fraction is 55 - 60%. 



                                          Trace aortic regurgitation. 



                                          Trace aortic regurgitation 









   



                 Performing Organization     Address         City/State/Zipcode     Phone Number

 

   



                      [facility]            [address] [place] [address] 





* CT Angiogram Pe Chest (2018 12:11 PM CDT)





 



                           Narrative                 Performed At

 

 



                           EXAMINATION: CT ANGIOGRAM PE CHEST      [facility]



                                         CLINICAL HISTORY: sob 



                                         TECHNIQUE:CT angiographic images of the chest are obtained during 



                                         intravenous administration of iodinated contrast. Computerized reformatted 



                                         images and 3-D images were also obtained and archived. CT pulmonary embolus 



                                         protocol. 



                                         CT scans are performed using radiation dose reduction techniques.Technical 





                                         factors are evaluated and adjusted to ensure appropriate moderation of 



                                         exposure.Automated dose management technology is applied to adjust radiation

 



                                         exposure while achieving a 



                                         diagnostic quality image. 



                                         COMPARISON: 2018 



                                         FINDINGS: The endotracheal tube and nasogastric tube are present. The thoracic 





                                         aorta has no aneurysmal dilatation. 



                                         The pulmonary arteries are well opacified. There is no evidence of any pulmonary

 



                                         embolism. The heart has no pericardial effusion. 



                                         The visualized portions of the liver, spleen are unremarkable. 



                                         There is a 6 cm calcified hematoma seen within the left adrenal gland. This was

 



                                         seen on prior studies and is unchanged. 



                                         The lung zones demonstrate mild emphysematous changes to be present. There is 



                                         mild compressive atelectasis seen at the lower lung bases. There is no pleural 





                                         effusion or pneumothorax. 



                                         IMPRESSION: 



                                         1. There is no evidence of any pulmonary embolism. 



                                         2. The thoracic aorta has no aneurysmal dilatation or dissection. Mild 



                                         atherosclerotic vascular changes are present. 



                                         3. The lung zones demonstrate emphysematous changes to be present. There is no 





                                         focal consolidation. 



                                         4. Compressive atelectasis is seen at the lower lung bases. 



                                         [facility]-[id number] 









                                        Procedure Note

 

                                        



 Interface, Radiology Results Incoming - 2018 12:25 PM CDT



EXAMINATION:   CT ANGIOGRAM PE CHEST



CLINICAL HISTORY:   sob



TECHNIQUE:  CT angiographic images of the chest are obtained during intravenous 
administration of iodinated contrast. Computerized reformatted images and 3-D 
images were also obtained and archived. CT pulmonary embolus protocol.



CT scans are performed using radiation dose reduction techniques.  Technical 
factors are evaluated and adjusted to ensure appropriate moderation of exposure.
 Automated dose management technology is applied to adjust radiation exposure 
while achieving a 

diagnostic quality image.



COMPARISON:   2018





FINDINGS: The endotracheal tube and nasogastric tube are present. The thoracic 
aorta has no aneurysmal dilatation.



The pulmonary arteries are well opacified. There is no evidence of any pulmonary
embolism. The heart has no pericardial effusion.



The visualized portions of the liver, spleen are unremarkable.



There is a 6 cm calcified hematoma seen within the left adrenal gland. This was 
seen on prior studies and is unchanged.



The lung zones demonstrate mild emphysematous changes to be present. There is 
mild compressive atelectasis seen at the lower lung bases. There is no pleural 
effusion or pneumothorax.







IMPRESSION:

                                        1. There is no evidence of any pulmonary embolism.

                                        2. The thoracic aorta has no aneurysmal dilatation or dissection. Mild atherosclerotic

 vascular changes are present.

                                        3. The lung zones demonstrate emphysematous changes to be present. There is no focal

 consolidation.

                                        4. Compressive atelectasis is seen at the lower lung bases.











[id number]











   



                 Performing Organization     Address         City/State/Zipcode     Phone Number

 

   



                     [facility]          [address] [place] [address] 





* CT Head Wo Contrast (2018 12:11 PM CDT)





 



                           Narrative                 Performed At

 

 



                           EXAMINATION: CT HEAD WO CONTRAST     [facility]



                                         CLINICAL HISTORY: HEADACHEACUTESEVERETHUNDERCLAPWORST LO OF LIFE

 



                                         COMPARISON:CT brain from 2018 



                                         TECHNIQUE: Noncontrast enhanced images of the brain were obtained from the skull

 



                                         base to the vertex. Both soft tissue and bone reconstruction algorithms were 



                                         performed.CT scans are performed using radiation dose reduction techniques.

 



                                         Technical factors 



                                         are evaluated and adjusted to ensure appropriate moderation of exposure. 



                                         Automated dose management technology is applied to adjust radiation exposure 



                                         while achieving a diagnostic quality image. 



                                         FINDINGS: 



                                         Artifacts obscure details. 



                                         There is no definite evidence of acute intracranial hemorrhage or mass, 



                                         hydrocephalus or midline shift, stroke or thrombus in the vessels. There are 



                                         relatively stable severe chronic changes in the brain including old infarcts in

 



                                         the cerebellum, left 



                                         parietal lobe, thalami, basal ganglia and possibly the gricelda. 



                                         There is an endotracheal tube and a nasogastric tube. 



                                         The orbits are unremarkable. There is mucosal thickening and partial 



                                         opacification of some of the sinuses. 



                                         IMPRESSION: 



                                         No acute intracranial abnormality identified. 



                                         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 



                                         Sinusitis which is improved compared with  year 



                                         [id number] 









                                        Procedure Note

 

                                        



 Interface, Radiology Results Incoming - 2018 12:16 PM CDT



EXAMINATION: CT HEAD WO CONTRAST



CLINICAL HISTORY: HEADACHE  ACUTE  SEVERE  THUNDERCLAP  WORST HA OF LIFE



COMPARISON:  CT brain from 2018



TECHNIQUE: Noncontrast enhanced images of the brain were obtained from the skull
base to the vertex. Both soft tissue and bone reconstruction algorithms were 
performed.  CT scans are performed using radiation dose reduction techniques. 
Technical factors 

are evaluated and adjusted to ensure appropriate moderation of exposure. 
Automated dose management technology is applied to adjust radiation exposure 
while achieving a diagnostic quality image.





FINDINGS:



Artifacts obscure details.



There is no definite evidence of acute intracranial hemorrhage or mass, 
hydrocephalus or midline shift, stroke or thrombus in the vessels. There are 
relatively stable severe chronic changes in the brain including old infarcts in 
the cerebellum, left 

parietal lobe, thalami, basal ganglia and possibly the gricelda.



There is an endotracheal tube and a nasogastric tube.



The orbits are unremarkable. There is mucosal thickening and partial 
opacification of some of the sinuses.





IMPRESSION:



No acute intracranial abnormality identified.



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



Sinusitis which is improved compared with January this year









[facility]-[id number]











   



                 Performing Organization     Address         City/[facility]     Phone Number

 

   



                     [facility]          [address] [place] [address] 





* Blood culture, aerobic & anaerobic (2018  9:56 AM CDT)



Only the most recent of 2 results within the time period is included.





   



                 Component       Value           Ref Range       Performed At

 

   



                     Blood culture isolate     No growth after 5 days of      [facility] DEPARTMENT OF



                           incubation.               PATHOLOGY AND



                           Comment:                  GENOMIC MEDICINE



                                         Specimen Information  



                                         Specimen Source: Blood  



                                         Specimen Site: Hand Right  













                                         Specimen

 





                                         Blood









   



                 Performing Organization     Address         City/[facility] Cent[facility]ode     Phone Number

 

   



                     [facility]     [address] [place] [address] 



                                         PATHOLOGY AND GENOMIC   



                                         MEDICINE   





* Prothrombin time with INR (2018  9:51 AM CDT)





   



                 Component       Value           Ref Range       Performed At

 

   



                 Prothrombin time     14.7            12.0 - 15.0 sec     [facility] DEPARTMENT OF



                                         PATHOLOGY AND



                                         GENOMIC MEDICINE

 

   



                 INR             1.13 (H)        0.92 - 1.12     [facility] DEPARTMENT OF



                           Comment:                  PATHOLOGY AND



                           For patients on anticoagulant      GENOMIC MEDICINE



                                         therapy, reference ranges  



                                         below:  



                                         Indication:  



                                           



                                         INR Value  



                                         Treatment of Venous  



                                         Thrombosis,  



                                          2.0-3.0  



                                         pulmonary emboli, or  



                                         prophylaxis  



                                         of a venous thrombosis, or  



                                         systemic  



                                         emboli.  



                                         High dose, high risk  



                                         patients  



                                          3.0-4.5  



                                         with mechanical valves.  



                                         NOTE:INR values over 3.0  



                                         are sometimes associated with  



                                         gastrointestinal hemorrhage,  



                                         especially values over 4.0.  













                                         Specimen

 





                                         Blood









   



                 Performing Organization     Address         City/[facility] Cent[facility]ode     Phone Number

 

   



                     [facility]     [address] [place] [address] 



                                         PATHOLOGY AND GENOMIC   



                                         MEDICINE   





* B natriuretic peptide (2018  9:51 AM CDT)





   



                 Component       Value           Ref Range       Performed At

 

   



                 BNP             214 (H)         0 - 100 pg/mL     [facility] DEPARTMENT OF



                                         PATHOLOGY AND



                                         GENOMIC MEDICINE













                                         Specimen

 





                                         Blood









   



                 Performing Organization     Address         City/[facility] Centercode     Phone Number

 

   



                     [place] [address] [place] [address] 



                                         PATHOLOGY AND GENOMIC   



                                         MEDICINE   





* Comprehensive metabolic panel (2018  9:51 AM CDT)





   



                 Component       Value           Ref Range       Performed At

 

   



                 Sodium          138             135 - 150 mEq/L     [facility] DEPARTMENT OF



                                         PATHOLOGY AND



                                         GENOMIC MEDICINE

 

   



                 Potassium       4.8             3.5 - 5.0 mEq/L     [facility] DEPARTMENT OF



                                         PATHOLOGY AND



                                         GENOMIC MEDICINE

 

   



                 Chloride        101             98 - 112 mEq/L     [facility] DEPARTMENT OF



                                         PATHOLOGY AND



                                         GENOMIC MEDICINE

 

   



                 CO2             21 (L)          24 - 31 mmol/L     [facility] DEPARTMENT OF



                                         PATHOLOGY AND



                                         GENOMIC MEDICINE

 

   



                 Anion gap       16@ANIO (H)     7 - 15 mEq/L     [facility] DEPARTMENT OF



                                         PATHOLOGY AND



                                         GENOMIC MEDICINE

 

   



                 BUN             30 (H)          7 - 18 mg/dL     [facility] DEPARTMENT OF



                                         PATHOLOGY AND



                                         GENOMIC MEDICINE

 

   



                 Creatinine      0.80            0.50 - 0.90 mg/dL     [facility] DEPARTMENT OF



                                         PATHOLOGY AND



                                         GENOMIC MEDICINE

 

   



                 Glucose         160 (H)         65 - 100 mg/dL     [facility] DEPARTMENT OF



                                         PATHOLOGY AND



                                         GENOMIC MEDICINE

 

   



                 Calcium         9.7             8.8 - 10.2 mg/dL     [facility] DEPARTMENT OF



                                         PATHOLOGY AND



                                         GENOMIC MEDICINE

 

   



                 Protein         7.7             6.3 - 8.3 g/dL     [facility] DEPARTMENT OF



                                         PATHOLOGY AND



                                         GENOMIC MEDICINE

 

   



                 Albumin         3.3 (L)         3.5 - 5.0 g/dL     [facility] DEPARTMENT OF



                                         PATHOLOGY AND



                                         GENOMIC MEDICINE

 

   



                 A/G ratio       0.8             0.7 - 3.8       [facility] DEPARTMENT OF



                                         PATHOLOGY AND



                                         GENOMIC MEDICINE

 

   



                 Alkaline phosphatase     109 (H)         0 - 104 U/L     [facility] DEPARTMENT OF



                                         PATHOLOGY AND



                                         GENOMIC MEDICINE

 

   



                 AST             25              10 - 35 U/L     [facility] DEPARTMENT OF



                                         PATHOLOGY AND



                                         GENOMIC MEDICINE

 

   



                 ALT             16              5 - 50 U/L      [facility] DEPARTMENT OF



                                         PATHOLOGY AND



                                         GENOMIC MEDICINE

 

   



                 Total bilirubin     0.8             0.2 - 1.2 mg/dL     [facility] DEPARTMENT OF



                                         PATHOLOGY AND



                                         GENOMIC MEDICINE













                                         Specimen

 





                                         Plasma specimen









   



                 Performing Organization     Address         City/[facility] Centercode     Phone Number

 

   



                     [place] [address] [place] [address] 



                                         PATHOLOGY AND GENOMIC   



                                         MEDICINE   





* Urinalysis screen and microscopy, with reflex to culture (2018  9:50 AM 
  CDT)





   



                 Component       Value           Ref Range       Performed At

 

   



                     Specimen site       Lai               [facility] DEPARTMENT OF



                                         PATHOLOGY AND



                                         GENOMIC MEDICINE

 

   



                     Color, UA           Yellow              [facility] DEPARTMENT OF



                                         PATHOLOGY AND



                                         GENOMIC MEDICINE

 

   



                     Appearance, UA      Slightly-Cloudy      [facility] DEPARTMENT OF



                                         PATHOLOGY AND



                                         GENOMIC MEDICINE

 

   



                 Specific gravity, UA     1.014           1.001 - 1.035     [facility] DEPARTMENT OF



                                         PATHOLOGY AND



                                         GENOMIC MEDICINE

 

   



                 pH, UA          8.0             5.0 - 8.5       [facility] DEPARTMENT OF



                                         PATHOLOGY AND



                                         GENOMIC MEDICINE

 

   



                 Protein, UA     1+ (A)          Negative        [facility] DEPARTMENT OF



                                         PATHOLOGY AND



                                         GENOMIC MEDICINE

 

   



                 Glucose, UA     Negative        Negative        [facility] DEPARTMENT OF



                                         PATHOLOGY AND



                                         GENOMIC MEDICINE

 

   



                 Ketones, UA     Negative        Negative        [facility] DEPARTMENT OF



                                         PATHOLOGY AND



                                         GENOMIC MEDICINE

 

   



                 Bilirubin, UA     Negative        Negative        [facility] DEPARTMENT OF



                                         PATHOLOGY AND



                                         GENOMIC MEDICINE

 

   



                 Blood, UA       Negative        Negative        [facility] DEPARTMENT OF



                                         PATHOLOGY AND



                                         GENOMIC MEDICINE

 

   



                 Nitrite, UA     Negative        Negative        [facility] DEPARTMENT OF



                                         PATHOLOGY AND



                                         GENOMIC MEDICINE

 

   



                 Urobilinogen, UA     4.0 (A)         <2.0            [facility] DEPARTMENT OF



                                         PATHOLOGY AND



                                         GENOMIC MEDICINE

 

   



                 Leukocyte esterase, UA     Trace (A)       Negative        [facility] DEPARTMENT OF



                                         PATHOLOGY AND



                                         GENOMIC MEDICINE

 

   



                 Epithelial cells, UA     Few             /HPF            [facility] DEPARTMENT OF



                                         PATHOLOGY AND



                                         GENOMIC MEDICINE

 

   



                 WBC, UA         13 (H)          0 - 5 /HPF      [facility] DEPARTMENT OF



                                         PATHOLOGY AND



                                         GENOMIC MEDICINE

 

   



                 RBC, UA         2               0 - 5 /HPF      [facility] DEPARTMENT OF



                                         PATHOLOGY AND



                                         GENOMIC MEDICINE

 

   



                 Bacteria, UA     Trace           None seen       [facility] DEPARTMENT OF



                                         PATHOLOGY AND



                                         GENOMIC MEDICINE

 

   



                     Yeast, UA           None seen           [facility] DEPARTMENT OF



                                         PATHOLOGY AND



                                         GENOMIC MEDICINE

 

   



                     Yeast with pseudohyphae,     None seen           [facility] DEPARTMENT OF



                           UA                        PATHOLOGY AND



                                         GENOMIC MEDICINE













                                         Specimen

 





                                         Urine









   



                 Performing Organization     Address         City/State/Zipcode     Phone Number

 

   



                     [facility] DEPARTMENT      [address] [place] [address] 



                                         PATHOLOGY AND GENOMIC   



                                         MEDICINE   





* Gram stain (2018  9:50 AM CDT)





   



                 Component       Value           Ref Range       Performed At

 

   



                     Gram stain result     Rare WBC's          [facility] DEPARTMENT OF



                           Few Gram positive cocci in      PATHOLOGY AND



                           pairs                     GENOMIC MEDICINE



                                         Comment:  



                                         Specimen Information  



                                         Specimen Source: Urine  



                                         Specimen Site: [place]  













                                         Specimen

 





                                         Urine - [place]









   



                 Performing Organization     Address         City/State/Zipcode     Phone Number

 

   



                     [facility] DEPARTMENT OF     [address] [place] [address] 



                                         PATHOLOGY AND GENOMIC   



                                         MEDICINE   





* Urine culture (2018  9:50 AM CDT)





   



                 Component       Value           Ref Range       Performed At

 

   



                     Urine culture isolate     Mixed Gram positive miguelito      [facility] DEPARTMENT OF



                           10-1 cfu/ml               PATHOLOGY AND



                           (A)                       GENOMIC MEDICINE



                                         Comment:  



                                         Specimen Information  



                                         Specimen Source: Urine  



                                         Specimen Site: [place]  













                                         Specimen

 





                                         Urine - [place]









   



                 Performing Organization     Address         City/[facility]/[place]     Phone Number

 

   



                     [facility] DEPARTMENT OF     [address] [place] [address] 



                                         PATHOLOGY AND GENOMIC   



                                         MEDICINE   





* ECG ED Preliminary Interpretation - NOT AN ORDER (2018  9:41 AM CDT)





 



                           Narrative                 Performed At

 

 



                                         Cherri Diaz MD 20189:41 AM 



                                         ECG ED Preliminary Interpretation - Not an Order 



                                         Performed by: CHERRI DIAZ 



                                         Authorized by: CHERRI DIAZ 



                                         ECG reviewed by ED Physician in the absence of a cardiologist: yes 



                                         Previous ECG: 



                                         Previous ECG:Compared to current 



                                         Interpretation: 



                                         Interpretation: normal 



                                         Rate: 



                                         ECG rate:108 



                                         ECG rate assessment: tachycardic 



                                         Rhythm: 



                                         Rhythm: sinus rhythm and sinus tachycardia 



                                         Ectopy: 



                                         Ectopy: none 



                                         QRS: 



                                         QRS axis:Normal 



                                         Conduction: 



                                         Conduction: normal 



                                         ST segments: 



                                         ST segments:Normal 



                                         T waves: 



                                         T waves: normal 





* ECG 12 lead (2018  9:38 AM CDT)





   



                 Component       Value           Ref Range       Performed At

 

   



                     Ventricular rate     108                 [facility]

 

   



                     Atrial rate         108                 [facility]

 

   



                     LA interval         188                 [facility]

 

   



                     QRSD interval       106                 [facility]

 

   



                     QT interval         356                 [facility]

 

   



                     QTC interval        477                 [facility]

 

   



                     P axis 1            77                  [facility]

 

   



                     QRS axis 1          -29                 [facility]

 

   



                     T wave axis         60                  [facility]

 

   



                     EKG impression      Sinus tachycardia-Inferior      [facility]



                                         infarct , age  



                                         undetermined-Abnormal ECG-No  



                                         previous ECGs  



                                         available-Electronically  



                                         Signed By Jair Cain MD ([id number]) on  



                                         2018 2:42:19 PM  









   



                 Performing Organization     Address         City/[facility]     Phone Number

 

   



                     [facility]            [address] [place] [address] 





* INTUBATION (2018  8:58 AM CDT)





 



                           Narrative                 Performed At

 

 



                                         Cherri Diaz MD 2018 11:28 AM 



                                         Intubation 



                                         Performed by: CHERRI DIAZ 



                                         Authorized by: CHERRI DIAZ 



                                         Consent: 



                                         Consent obtained:Emergent situation 



                                         Consent given by: son. 



                                         Risks discussed:Aspiration, bleeding, death, brain injury, dental 



                                         trauma, hypoxia, laryngeal injury and pneumothorax 



                                         Alternatives discussed:No treatment, delayed treatment, alternative 



                                         treatment, observation and referral 



                                         Universal protocol: 



                                         Procedure explained and questions answered to patient or proxy's 



                                         satisfaction: yes 



                                         Relevant documents present and verified: yes 



                                         Test results available and properly labeled: yes 



                                         Imaging studies available: yes 



                                         Required blood products, implants, devices, and special equipment 



                                         available: yes 



                                         Site/side marked: yes 



                                         Immediately prior to procedure, a time out was called: yes 



                                         Patient identity confirmed:Arm band 



                                         Pre-procedure details: 



                                         Patient status:Altered mental status 



                                         Mallampati score:3 



                                         Pretreatment medications:Lidocaine 



                                         Induction:Etomidate 



                                         Paralytics:Succinylcholine 



                                         Procedure details: 



                                         Preoxygenation:Bag valve mask 



                                         CPR in progress: no 



                                         Intubation method:Oral 



                                         Technique:Video laryngoscopy 



                                         Laryngoscope blade:Mac 3 



                                         Grade view:3 



                                         Difficult airway?: Yes 



                                         Tube size (mm):7.5 



                                         Tube type:Cuffed 



                                         Number of attempts:1 



                                         Ventilation between attempts: no 



                                         Cricoid pressure: yes 



                                         Tube visualized through cords: yes 



                                         Placement assessment: 



                                         ETT to lip:23 



                                         ETT to teeth:23 



                                         Tube secured with:ETT chavez 



                                         Breath sounds:Equal 



                                         Placement verification: chest rise and CXR verification 



                                         CXR findings:ETT in proper place 



                                         Post-procedure details: 



                                         Patient tolerance of procedure:Tolerated well, no immediate 



                                         complications 





* XR Abdomen 1 Vw Portable (2018  8:51 AM CDT)





 



                           Narrative                 Performed At

 

 



                           EXAMINATION:XR ABDOMEN 1 VW PORTABLE     [facility]



                                         CLINICAL HISTORY:feeding tube placement 



                                         COMPARISON:None. 



                                         IMPRESSION: 



                                         Gastrostomy tube is located in the gastric fundus. Contrast was administered 



                                         which opacifies the gastric lumen and demonstrates mild gastroesophageal reflux.

 



                                         Bowel gas pattern is nonobstructive. There is no gross intra-abdominal free air.

 



                                         Eggshell 



                                         calcification related to the left adrenal gland noted in the left upper abdomen.

 



                                         There is vascular calcification. 



                                         [facility]-[id number] 









                                        Procedure Note

 

                                        



 Interface, Radiology Results Incoming - 2018  9:01 AM CDT



EXAMINATION:  XR ABDOMEN 1 VW PORTABLE



CLINICAL HISTORY:  feeding tube placement



COMPARISON:  None.



IMPRESSION:



Gastrostomy tube is located in the gastric fundus. Contrast was administered 
which opacifies the gastric lumen and demonstrates mild gastroesophageal reflux.
Bowel gas pattern is nonobstructive. There is no gross intra-abdominal free air.
Eggshell 

calcification related to the left adrenal gland noted in the left upper abdomen.
There is vascular calcification.





[facility]-[id number]











   



                 Performing Organization     Address         City/State/Zipcode     Phone Number

 

   



                     [facility]          [address] [place] [address] 





* Feeding tube replacement (2018  8:13 AM CDT)





 



                           Narrative                 Performed At

 

 



                                         Cherri Diaz MD 20182:14 PM 



                                         GI Tubes 



                                         Performed by: CHERRI DIAZ 



                                         Authorized by: CHERRI DIAZ 



                                         Consent: 



                                         Consent obtained:Emergent situation 



                                         Consent given by:Healthcare agent 



                                         Universal protocol: 



                                         Patient identity confirmed:Arm band 



                                         Pre-procedure details: 



                                         Old tube type:Gastrostomy 



                                         Old tube size:24 Fr 



                                         Anesthesia (see MAR for exact dosages): 



                                         Anesthesia method:None 



                                         Procedure details: 



                                         Patient position:Sitting 



                                         Procedure type:Replacement 



                                         Fluoro Guidance?: No 



                                         Tube type:Gastrostomy 



                                         Tube size:24 Fr 



                                         Bulb inflation volume:10 



                                         Bulb inflation fluid:Normal saline 



                                         Post-procedure details: 



                                         Placement difficulty:None 



                                         Bleeding:None 



                                         Patient tolerance of procedure:Tolerated well, no immediate 



                                         complications 



                                         Comments: 



                                          X-ray is ordered, no results yet. 





after 2018



Insurance







     



            Payer      Benefit     Subscriber ID     Type       Phone      Address



                                         Plan /    



                                         Group    

 

     



              MEDICARE     MEDICARE     xxxxxxxxxx     Medicare [place], [place]



                                         PART A AND    



                                         B    









     



            Guarantor Name     Account     Relation to     Date of     Phone      Billing Address



                     Type                Patient             Birth  

 

     



            Vianca Jarquin     Personal/F     Self       1935     [phone] [id number]               (Home)              [place] [address]







Advance Directives





Patient has advance care planning documents on file. For more information, christine ho contact:



Spencer Guzmán



[address] [place] [address]

## 2019-03-12 NOTE — XMS REPORT
Patient Summary Document

                             Created on: 2019



PROSPER PUENTE

External Reference #: [id number]

: 1935

Sex: Female



Demographics







                          Address                   [address] [place]  [address]

 

                          Home Phone                [phone]

 

                          Preferred Language        Unknown

 

                          Marital Status            Unknown

 

                          Hinduism Affiliation     Unknown

 

                          Race                      Unknown

 

                                        Additional Race(s)  

 

                          Ethnic Group              Unknown





Author







                          Author                    [facility]

 

                          [facility]

 

                          Address                   Unknown

 

                          Phone                     Unavailable







Care Team Providers







                    Care Team Member Name    Role                Phone

 

                    MALLORY ARNOLD    Unavailable         Unavailable







Problems

This patient has no known problems.



Allergies, Adverse Reactions, Alerts

This patient has no known allergies or adverse reactions.



Medications

This patient has no known medications.



Results







           Test Description    Test Time    Test Comments    Text Results    Atomic Results    Result

 Comments

 

                CHEST SINGLE (PORTABLE)    2019 13:15:00                                                   

                                                       [place] [address]      Patient Name: PROSPER PUENTE                       
           MR #: [id number]                     : 1935                 
                 Age/Sex: 83/F  Acct #: [id number]                            
 Req #: [id number]  Adm Physician:                                              
       Ordered by: JOSÉ MANUEL ARNOLD MD                            Report #: 
[id number]        Location: ER                                      Room/Bed:    
                
___________________________________________________________________________________________________
   Procedure: [id number] DX/CHEST SINGLE (PORTABLE)  Exam Date: 19         
                  Exam Time: 1300                                              
REPORT STATUS: Signed    EXAMINATION:  CHEST SINGLE (PORTABLE)          IN
DICATION: Sepsis.       COMPARISON:  None           FINDINGS:   TUBES and LINES:
 None.      LUNGS: Low lung volumes. Mild patchy bibasilar opacities. No 
evidence of lobar   consolidation or pulmonary edema.      PLEURA:  No pleural 
effusion or pneumothorax.       HEART AND MEDIASTINUM:  The cardiomediastinal 
silhouette is unremarkable. There   are atherosclerotic calcifications within 
the aorta.      BONES AND SOFT TISSUES:  No acute osseous abnormality.      
UPPER ABDOMEN: No free air under the diaphragm.          IMPRESSION:    Low lung
volumes with patchy bibasilar opacities, which may reflect atelectasis   or 
early pneumonia in the appropriate clinical setting. Follow-up chest   
radiograph is suggested to assess for resolution.      Signed by: Dr. Young Elliott MD on 3/12/2019 1:17 PM        Dictated By: YOUNG ELLIOTT MD  Electronically 
Signed By: YOUNG ELLIOTT MD on 19 1317  Transcribed By: DEVONTE on 19 
1317       COPY TO:   JOSÉ MANUEL ARNOLD MD

## 2019-03-12 NOTE — XMS REPORT
Clinical Summary

                             Created on: 2019



Vianca Jarquin

External Reference #: [id number]

: 1935

Sex: Female



Demographics







                          Address                   [address] 

[place]  [address]

 

                          Home Phone                [phone]

 

                          Preferred Language        English

 

                          Marital Status            

 

                          Alevism Affiliation     Unknown

 

                          Race                      White

 

                          Ethnic Group              Non-





Author







                          Author                    [facility]

 

                          Organization              [facility]

 

                          Address                   Unknown

 

                          Phone                     Unavailable







Support







                Name            Relationship    Address         Phone

 

                BackYasmany dunham    [employer]            Unknown         [phone]

 

                Michael Jarquin    [employer]            Unknown         [phone]







Care Team Providers







                    Care Team Member Name    Role                Phone

 

                    Michael Edmond MD    PCP                 [phone]







Allergies







                                        Comments



                 Active Allergy     Reactions       Severity        Noted Date 

 

                                         



                           Lactase                   2018 

 

                                         



                           Egg                       2018 

 

                                         



                           Penicillin G              2018 

 

                                         



                           Sulfa (Sulfonamide        2018 



                                         Antibiotics)    







Medications







                          End Date                  Status



              Medication     Sig          Dispensed     Refills      Start  



                                         Date  

 

                                                    Active



                     bisacodyl (DULCOLAX) 10     Insert 10 mg        0   



                           mg suppository            into the     



                                         rectum daily     



                                         as needed for     



                                         constipation.     

 

                                                    Active



                     lactulose 10 gram/15 mL     Take 20 g by        0   



                           (15 mL) solution          mouth daily.     

 

                                                    Active



                     multivitamin with     Take 1 tablet       0   



                           minerals tablet           by mouth     



                                         daily.     

 

                                                    Active



                     levothyroxine (SYNTHROID,     Take 100 mcg        0   



                           LEVOXYL) 100 mcg tablet     by mouth     



                                         daily.     

 

                                                    Active



                     acetaminophen (TYLENOL)     Take 650 mg         0   



                           325 MG tablet             by mouth     



                                         every 6 (six)     



                                         hours as     



                                         needed for     



                                         fever.     

 

                          2018                Discontinued



                     albuterol (ACCUNEB) 2.5     Take 2.5 mg         0   



                           mg /3 mL (0.083 %)        by     



                           nebulizer solution        nebulization     



                                         every 6 (six)     



                                         hours as     



                                         needed for     



                                         wheezing.     

 

                          2018                Discontinued



                     thiamine 100 MG tablet     Take 100 mg         0   



                                         by mouth     



                                         daily.     

 

                          2018                



              metoprolol tartrate     1 tablet (25     60 tablet     0              



                     (LOPRESSOR) 25 mg tablet     mg total) by        8  



                                         g-tube route     



                                         2 (two) times     



                                         a day for 30     



                                         days.     

 

                          2018                



              rivaroxaban (XARELTO) 15     Take 1 tablet     30 tablet     0              



                     mg tablet           (15 mg total)       8  



                                         by mouth     



                                         daily for 30     



                                         days.     

 

                          2018                



              predniSONE (DELTASONE) 10     Take 1 tablet     30 tablet     0              



                     mg tablet           (10 mg total)       8  



                                         by mouth     



                                         daily for 30     



                                         days.     

 

                          2018                



              nortriptyline (PAMELOR)     1 capsule (50     30 capsule     0              



                     50 MG capsule       mg total) by        8  



                                         g-tube route     



                                         nightly for     



                                         30 days.     







Active Problems







 



                           Problem                   Noted Date

 

 



                           Hypoxia                   2018

 

 



                           Altered mental status, unspecified     2018

 

 



                           Altered mental status     2018







Encounters







                          Care Team                 Description



                     Date                Type                Specialty  

 

                                        



Cherri Diaz MD Demmler, Michael RUCKER MD                 Hypoxia (Primary Dx); 

Sepsis, due to unspecified organism



                     2018          Hospital            General Internal Medicine  



                           -                         Encounter   



                                         2018    

 

                                        



Cherri Diaz MD                  PEG tube malfunction (Primary Dx); 

Generalized abdominal pain



                     2018          Emergency           Emergency Medicine  



after 2018



Social History







                                        Date



                 Tobacco Use     Types           Packs/Day       Years Used 

 

                                         



                                         Unknown If Ever Smoked    

 

    



                                         Smokeless Tobacco: Never   



                                         Used   









   



                 Alcohol Use     Drinks/Week     oz/Week         Comments

 

   



                                         No   









 



                           Sex Assigned at Birth     Date Recorded

 

 



                                         Not on file 









                                        Industry



                           Job Start Date            Occupation 

 

                                        Not on file



                           Not on file               Not on file 









                                        Travel End



                           Travel History            Travel Start 

 





                                         No recent travel history available.







Last Filed Vital Signs







                                        Time Taken



                           Vital Sign                Reading 

 

                                        2018 11:35 AM CDT



                           Blood Pressure            127/78 

 

                                        2018 11:35 AM CDT



                           Pulse                     110 

 

                                        2018 11:35 AM CDT



                           Temperature               36.7 C (98.1 F) 

 

                                        2018 11:35 AM CDT



                           Respiratory Rate          18 

 

                                        2018 11:35 AM CDT



                           Oxygen Saturation         100% 

 

                                        -



                           Inhaled Oxygen            - 



                                         Concentration  

 

                                        2018  9:17 AM CDT



                           Weight                    45.4 kg (100 lb) 

 

                                        2018  8:13 AM CDT



                           Height                    157.5 cm (5' 2") 

 

                                        2018  9:17 AM CDT



                           Body Mass Index           18.29 







Plan of Treatment







   



                 Health Maintenance     Due Date        Last Done       Comments

 

   



                           SHINGLES VACCINES (#1)     1986  

 

   



                           65+ PNEUMOCOCCAL VACCINE     2001  



                                         (1 of 2 - PCV13)   

 

   



                           PNEUMOCOCCAL              2001  



                                         POLYSACCHARIDE VACCINE   



                                         AGE 65 AND OVER   

 

   



                           INFLUENZA VACCINE         2018  







Procedures







                                        Comments



                 Procedure Name     Priority        Date/Time       Associated Diagnosis 

 

                                        



Results for this procedure are in the results section.



                     POC GLUCOSE         Routine             2018  



                                         2:21 PM CDT  

 

                                        



Results for this procedure are in the results section.



                     POC GLUCOSE         Routine             2018  



                                         9:50 AM CDT  

 

                                        



Results for this procedure are in the results section.



                     XR CHEST 1 VW PORTABLE     Routine             2018  



                                         9:45 AM CDT  

 

                                        



Results for this procedure are in the results section.



                     ZZESTIMATED GFR     Routine             2018  



                                         7:40 AM CDT  

 

                                        



Results for this procedure are in the results section.



                     PHOSPHORUS LEVEL     Routine             2018  



                                         7:40 AM CDT  

 

                                        



Results for this procedure are in the results section.



                     HC COMPLETE BLD COUNT     Routine             2018  



                           W/AUTO DIFF               7:40 AM CDT  

 

                                        



Results for this procedure are in the results section.



                     BASIC METABOLIC PANEL     Routine             2018  



                                         7:40 AM CDT  

 

                                        



Results for this procedure are in the results section.



                     POC GLUCOSE         Routine             2018  



                                         5:18 AM CDT  

 

                                        



Results for this procedure are in the results section.



                     POC GLUCOSE         Routine             2018  



                                         1:33 AM CDT  

 

                                        



Results for this procedure are in the results section.



                     POC GLUCOSE         Routine             2018  



                                         9:30 PM CDT  

 

                                        



Results for this procedure are in the results section.



                     POC GLUCOSE         Routine             2018  



                                         4:19 PM CDT  

 

                                        



Results for this procedure are in the results section.



                     POC GLUCOSE         Routine             2018  



                                         1:12 PM CDT  

 

                                        



Results for this procedure are in the results section.



                     POC GLUCOSE         Routine             2018  



                                         8:59 AM CDT  

 

                                        



Results for this procedure are in the results section.



                     XR CHEST 1 VW PORTABLE     Routine             2018  



                                         6:40 AM CDT  

 

                                        



Results for this procedure are in the results section.



                     ARTERIAL BLOOD GAS     Routine             2018  



                                         5:01 AM CDT  

 

                                        



Results for this procedure are in the results section.



                     ZZESTIMATED GFR     Routine             2018  



                                         3:20 AM CDT  

 

                                        



Results for this procedure are in the results section.



                     LACTIC ACID LEVEL     Routine             2018  



                                         3:20 AM CDT  

 

                                        



Results for this procedure are in the results section.



                     PHOSPHORUS LEVEL     Routine             2018  



                                         3:20 AM CDT  

 

                                        



Results for this procedure are in the results section.



                     HC COMPLETE BLD COUNT     Routine             2018  



                           W/AUTO DIFF               3:20 AM CDT  

 

                                        



Results for this procedure are in the results section.



                     BASIC METABOLIC PANEL     Routine             2018  



                                         3:20 AM CDT  

 

                                        



Results for this procedure are in the results section.



                     POC GLUCOSE         Routine             2018  



                                         8:52 PM CDT  

 

                                        



Results for this procedure are in the results section.



                     LACTIC ACID LEVEL     Timed               2018  



                                         6:37 PM CDT  

 

                                        



Results for this procedure are in the results section.



                     POC GLUCOSE         Routine             2018  



                                         3:49 PM CDT  

 

                                        



Results for this procedure are in the results section.



                     LACTIC ACID LEVEL     Timed               2018  



                                         2:55 PM CDT  

 

                                        



Results for this procedure are in the results section.



                     POC GLUCOSE         Routine             2018  



                                         11:47 AM CDT  

 

                                        



Results for this procedure are in the results section.



                     LACTIC ACID LEVEL     Timed               2018  



                                         8:37 AM CDT  

 

                                        



Results for this procedure are in the results section.



                     POC GLUCOSE         Routine             2018  



                                         7:20 AM CDT  

 

                                        



Results for this procedure are in the results section.



                     XR CHEST 1 VW PORTABLE     Routine             2018  



                                         6:29 AM CDT  

 

                                        



Results for this procedure are in the results section.



                     POC GLUCOSE         Routine             2018  



                                         4:59 AM CDT  

 

                                        



Results for this procedure are in the results section.



                     PHOSPHORUS LEVEL     Routine             2018  



                                         4:16 AM CDT  

 

                                        



Results for this procedure are in the results section.



                     ZZESTIMATED GFR     Routine             2018  



                                         4:16 AM CDT  

 

                                        



Results for this procedure are in the results section.



                     MAGNESIUM LEVEL     Routine             2018  



                                         4:16 AM CDT  

 

                                        



Results for this procedure are in the results section.



                     HC COMPLETE BLD COUNT     Routine             2018  



                           W/AUTO DIFF               4:16 AM CDT  

 

                                        



Results for this procedure are in the results section.



                     BASIC METABOLIC PANEL     Routine             2018  



                                         4:16 AM CDT  

 

                                        



Results for this procedure are in the results section.



                     HEPATIC FUNCTION PANEL     Routine             2018  



                                         4:16 AM CDT  

 

                                        



Results for this procedure are in the results section.



                     ARTERIAL BLOOD GAS     Routine             2018  



                                         4:05 AM CDT  

 

                                        



Results for this procedure are in the results section.



                     POC GLUCOSE         Routine             2018  



                                         1:54 AM CDT  

 

                                        



Results for this procedure are in the results section.



                     ZZESTIMATED GFR     Timed               2018  



                                         8:56 PM CDT  

 

                                        



Results for this procedure are in the results section.



                     BASIC METABOLIC PANEL     Timed               2018  



                                         8:56 PM CDT  

 

                                        



Results for this procedure are in the results section.



                     TROPONIN            Timed               2018  



                                         8:56 PM CDT  

 

                                        



Results for this procedure are in the results section.



                     LACTIC ACID LEVEL     Timed               2018  



                                         8:56 PM CDT  

 

                                        



Results for this procedure are in the results section.



                     POC GLUCOSE         Routine             2018  



                                         8:49 PM CDT  

 

                                        



Results for this procedure are in the results section.



                     RESPIRATORY PATHOGEN     Routine             2018  



                           PANEL                     6:05 PM CDT  

 

                                        



Results for this procedure are in the results section.



                     POC GLUCOSE         Routine             2018  



                                         4:08 PM CDT  

 

                                        



Results for this procedure are in the results section.



                     LACTIC ACID LEVEL, SEPSIS     Timed               2018  



                           - NOW AND REPEAT 2X EVERY      2:40 PM CDT  



                                         3 HOURS    

 

                                        



Results for this procedure are in the results section.



                     TROPONIN            Timed               2018  



                                         2:40 PM CDT  

 

                                        



Results for this procedure are in the results section.



                     ECHOCARDIOGRAM 2D     Routine             2018  



                           COMPLETE W MMODE SPECTRAL      1:56 PM CDT  



                                         COLOR DOPPLER ([id number])    

 

                                        



Results for this procedure are in the results section.



                     POC GLUCOSE         Routine             2018  



                                         12:34 PM CDT  

 

                                        



Results for this procedure are in the results section.



                     CT ANGIOGRAM PE CHEST     STAT                2018  



                                         12:11 PM CDT  

 

                                        



Results for this procedure are in the results section.



                     CT HEAD WO CONTRAST     STAT                2018  



                                         12:11 PM CDT  

 

                                        



Results for this procedure are in the results section.



                     ARTERIAL BLOOD GAS     Routine             2018  



                                         10:40 AM CDT  

 

                                        



Results for this procedure are in the results section.



                     BLOOD CULTURE, AEROBIC &     Routine             2018  



                           ANAEROBIC                 9:56 AM CDT  

 

                                        



Results for this procedure are in the results section.



                     XR CHEST 1 VW PORTABLE     STAT                2018  



                                         9:54 AM CDT  

 

                                        



Results for this procedure are in the results section.



                     ZZESTIMATED GFR     STAT                2018  



                                         9:51 AM CDT  

 

                                        



Results for this procedure are in the results section.



                     B NATRIURETIC PEPTIDE     STAT                2018  



                                         9:51 AM CDT  

 

                                        



Results for this procedure are in the results section.



                     TROPONIN            STAT                2018  



                                         9:51 AM CDT  

 

                                        



Results for this procedure are in the results section.



                     PROTHROMBIN TIME WITH INR     STAT                2018  



                                         9:51 AM CDT  

 

                                        



Results for this procedure are in the results section.



                     LACTIC ACID LEVEL, SEPSIS     STAT                2018  



                           - NOW AND REPEAT 2X EVERY      9:51 AM CDT  



                                         3 HOURS    

 

                                        



Results for this procedure are in the results section.



                     COMPREHENSIVE METABOLIC     STAT                2018  



                           PANEL                     9:51 AM CDT  

 

                                        



Results for this procedure are in the results section.



                     CBC WITH PLATELET AND     STAT                2018  



                           DIFFERENTIAL              9:51 AM CDT  

 

                                        



Results for this procedure are in the results section.



                     BLOOD CULTURE, AEROBIC &     Routine             2018  



                           ANAEROBIC                 9:51 AM CDT  

 

                                        



Results for this procedure are in the results section.



                     URINALYSIS SCREEN AND     STAT                2018  



                           MICROSCOPY, WITH REFLEX      9:50 AM CDT  



                                         TO CULTURE    

 

                                        



Results for this procedure are in the results section.



                     GRAM STAIN          STAT                2018  



                                         9:50 AM CDT  

 

                                        



Results for this procedure are in the results section.



                     URINE CULTURE       STAT                2018  



                                         9:50 AM CDT  

 

                                        



Results for this procedure are in the results section.



                     ECG ED PRELIMINARY     Routine             2018  



                           INTERPRETATION            9:41 AM CDT  

 

                                        



Results for this procedure are in the results section.



                     ECG 12-LEAD         STAT                2018  



                                         9:38 AM CDT  

 

                                        



Results for this procedure are in the results section.



                     INTUBATION          Routine             2018  



                                         8:58 AM CDT  

 

                                        



Results for this procedure are in the results section.



                     XR ABDOMEN 1 VW PORTABLE     STAT                2018  



                                         8:51 AM CDT  

 

                                        



Results for this procedure are in the results section.



                     KY CHANGE GASTROSTOMY     Routine             2018  



                           TUBE PERCUTANEOUS W/O      8:13 AM CDT  



                                         GUIDE    



after 2018



Results

* POC glucose (2018  2:21 PM CDT)



Only the most recent of 17 results within the time period is included.





   



                 Component       Value           Ref Range       Performed At

 

   



                 POC glucose     217 (H)         65 - 100 mg/dL     [facility]



                           Comment:                  PATHOLOGY AND



                           Meter ID: [id number]      GENOMIC MEDICINE



                                         : Dee Zapien  









   



                 Performing Organization     Address         City/State/Zipcode     Phone Number

 

   



                     [facility]     [address] [place] [address] 



                                         PATHOLOGY AND GENOMIC   



                                         MEDICINE   





* XR Chest 1 Vw Portable (2018  9:45 AM CDT)



Only the most recent of 4 results within the time period is included.





 



                           Narrative                 Performed At

 

 



                           EXAMINATION: XR CHEST 1 VW PORTABLE     [facility]



                                         INDICATION: Respiratory Failure or Arrest 



                                         COMPARISON: Most recent prior. Prior chest and abdominal CTs. 



                                         IMPRESSION: 



                                         Stable appearance of the heart and mediastinum. 



                                         Arteriosclerosis and tortuous thoracic aorta. 



                                         Hypoventilation of the lungs with bibasilar opacities likely representing 



                                         atelectasis. 



                                         Stable calcified granuloma right lower lobe. 



                                         No visible effusion or pneumothorax. 



                                         Stable calcified mass in the left upper quadrant is consistent with a chronic 



                                         left adrenal hematoma. 



                                         [facility]-[id number] 









                                        Procedure Note

 

                                        



Hm Interface, Radiology Results Incoming - 2018  9:53 AM CDT



EXAMINATION: XR CHEST 1 VW PORTABLE



INDICATION: Respiratory Failure or Arrest



COMPARISON: Most recent prior. Prior chest and abdominal CTs.



IMPRESSION:



Stable appearance of the heart and mediastinum.

Arteriosclerosis and tortuous thoracic aorta.

Hypoventilation of the lungs with bibasilar opacities likely representing 
atelectasis.

Stable calcified granuloma right lower lobe.

No visible effusion or pneumothorax.

Stable calcified mass in the left upper quadrant is consistent with a chronic 
left adrenal hematoma.



[facility]-[id number]











   



                 Performing Organization     Address         City/[facility]/Zipcode     Phone Number

 

   



                     [facility]          [address] [place] [address] 





* Estimated GFR (2018  7:40 AM CDT)



Only the most recent of 5 results within the time period is included.





   



                 Component       Value           Ref Range       Performed At

 

   



                 GFR Non Af Amer     >90             mL/min/1.73 m2     [facility]



                                         PATHOLOGY AND



                                         GENOMIC MEDICINE

 

   



                 GFR Af Amer     >90             mL/min/1.73 m2     [facility]



                           Comment:                  PATHOLOGY AND



                           Chronic kidney disease: <60      GENOMIC MEDICINE



                                         mL/min/1.73m2  



                                         Kidney failure: <15  



                                         mL/min/1.73m2  



                                         The estimated GFR is  



                                         calculated from the  



                                         IDMS-traceable Modification of  



                                         Diet  



                                         in Renal Disease Equation. The  



                                         accuracy of the calculation is  



                                         poor when the  



                                         creatinine is normal.  



                                         Calculated values >90  



                                         mL/min/1.73m2 are not  



                                         reported.  



                                         This equation has not been  



                                         validated in children (<18  



                                         years), pregnant  



                                         women, the elderly (>70  



                                         years), or ethnic groups other  



                                         than Caucasians and  



                                          Americans.  













                                         Specimen

 





                                         Plasma specimen









   



                 Performing Organization     Address         City/State/Zipcode     Phone Number

 

   



                     [facility] [address] [place] [address] 



                                         PATHOLOGY AND GENOMIC   



                                         MEDICINE   





* CBC with platelet and differential (2018  7:40 AM CDT)



Only the most recent of 4 results within the time period is included.





   



                 Component       Value           Ref Range       Performed At

 

   



                 WBC             7.4             4.2 - 11.0 k/uL     [facility]



                                         PATHOLOGY AND



                                         GENOMIC MEDICINE

 

   



                 RBC             4.27            4.04 - 5.86 m/uL     [facility]



                                         PATHOLOGY AND



                                         GENOMIC MEDICINE

 

   



                 HGB             12.0            11.5 - 15.3 g/dL     [facility]



                                         PATHOLOGY AND



                                         GENOMIC MEDICINE

 

   



                 HCT             37.5            34.0 - 45.0 %     [facility]



                                         PATHOLOGY AND



                                         GENOMIC MEDICINE

 

   



                 MCV             87.8            80.0 - 98.0 fL     [facility]



                                         PATHOLOGY AND



                                         GENOMIC MEDICINE

 

   



                 MCH             28.1            27.0 - 34.0 pg     [facility]



                                         PATHOLOGY AND



                                         GENOMIC MEDICINE

 

   



                 MCHC            32.0            31.5 - 36.5 g/dL     [facility]



                                         PATHOLOGY AND



                                         GENOMIC MEDICINE

 

   



                 RDW - SD        52.9 (H)        37.0 - 51.0 fL     [facility]



                                         PATHOLOGY AND



                                         GENOMIC MEDICINE

 

   



                 MPV             10.5 (H)        7.4 - 10.4 fL     [facility]



                                         PATHOLOGY AND



                                         GENOMIC MEDICINE

 

   



                 Platelet count     231             150 - 400 k/uL     [facility] 



                                         PATHOLOGY AND



                                         GENOMIC MEDICINE

 

   



                 Nucleated RBC     0.00            /100 WBC        [facility]



                                         PATHOLOGY AND



                                         GENOMIC MEDICINE

 

   



                 Neutrophils     76.0 (H)        36.0 - 66.0 %     [facility]



                                         PATHOLOGY AND



                                         GENOMIC MEDICINE

 

   



                 Lymphocytes     15.0 (L)        24.0 - 44.0 %     [facility]



                                         PATHOLOGY AND



                                         GENOMIC MEDICINE

 

   



                 Monocytes       7.8 (H)         0.0 - 6.0 %     [facility]



                                         PATHOLOGY AND



                                         GENOMIC MEDICINE

 

   



                 Eosinophils     0.5             0.0 - 6.0 %     [facility] 



                                         PATHOLOGY AND



                                         GENOMIC MEDICINE

 

   



                 Basophils       0.4             0.0 - 1.2 %     [facility]



                                         PATHOLOGY AND



                                         GENOMIC MEDICINE

 

   



                 Immature granulocytes     0.3             0.0 - 1.0 %     [facility]



                                         PATHOLOGY AND



                                         GENOMIC MEDICINE













                                         Specimen

 





                                         Blood









   



                 Performing Organization     Address         City/[facility]     Phone Number

 

   



                     [place] [address] 



                                         PATHOLOGY [facility]   





* Phosphorus level (2018  7:40 AM CDT)



Only the most recent of 3 results within the time period is included.





   



                 Component       Value           Ref Range       Performed At

 

   



                 Phosphorus      1.8 (L)         2.4 - 4.5 mg/dL     [facility]



                                         PATHOLOGY AND



                                         GENOMIC MEDICINE













                                         Specimen

 





                                         Plasma specimen









   



                 Performing Organization     Address         City/[facility]     Phone Number

 

   



                     [place] [address] 



                                         PATHOLOGY [facility]   





* Basic metabolic panel (2018  7:40 AM CDT)



Only the most recent of 4 results within the time period is included.





   



                 Component       Value           Ref Range       Performed At

 

   



                 Sodium          138             135 - 150 mEq/L     [facility] DEPARTMENT OF



                                         PATHOLOGY AND



                                         GENOMIC MEDICINE

 

   



                 Potassium       4.1             3.5 - 5.0 mEq/L     [facility]



                                         PATHOLOGY AND



                                         GENOMIC MEDICINE

 

   



                 Chloride        100             98 - 112 mEq/L     [facility]



                                         PATHOLOGY AND



                                         GENOMIC MEDICINE

 

   



                 CO2             24              24 - 31 mmol/L     [facility]



                                         PATHOLOGY AND



                                         GENOMIC MEDICINE

 

   



                 Anion gap       14@ANIO         7 - 15 mEq/L     [facility]



                                         PATHOLOGY AND



                                         GENOMIC MEDICINE

 

   



                 BUN             21 (H)          7 - 18 mg/dL     [facility]



                                         PATHOLOGY AND



                                         GENOMIC MEDICINE

 

   



                 Creatinine      0.60            0.50 - 0.90 mg/dL     [facility]



                                         PATHOLOGY AND



                                         GENOMIC MEDICINE

 

   



                 Glucose         168 (H)         65 - 100 mg/dL     [facility]



                                         PATHOLOGY AND



                                         GENOMIC MEDICINE

 

   



                 Calcium         9.6             8.8 - 10.2 mg/dL     [facility]



                                         PATHOLOGY AND



                                         GENOMIC MEDICINE













                                         Specimen

 





                                         Plasma specimen









   



                 Performing Organization     Address         City/State/Zipcode     Phone Number

 

   



                     [place] [address] [place] [address] 



                                         PATHOLOGY AND GENOMIC   



                                         MEDICINE   





* Arterial blood gas (2018  5:01 AM CDT)



Only the most recent of 3 results within the time period is included.





   



                 Component       Value           Ref Range       Performed At

 

   



                                 JSXB                [facility]



                                         PATHOLOGY AND



                                         GENOMIC MEDICINE

 

   



                     Collection site     LRA                 [facility]



                                         PATHOLOGY AND



                                         GENOMIC MEDICINE

 

   



                     O2 therapy          HFNC                [facility]



                                         PATHOLOGY AND



                                         GENOMIC MEDICINE

 

   



                 pH, arterial     7.580 (HH)      7.350 - 7.450 units     [facility]



                           Comment:                  PATHOLOGY AND



                           Results called to and read      GENOMIC MEDICINE



                                         back by AYE JUDGE  



                                         [id number] by  



                                         _IV_.  

 

   



                 pCO2, arterial     24.8 (LL)       35.0 - 45.0 mmHg     [facility]



                           Comment:                  PATHOLOGY AND



                           Results called to and read      GENOMIC MEDICINE



                                         back by AYE JUDGE  



                                         [id number] by  



                                         _IV_.  

 

   



                 pO2, arterial     176.0 (H)       80.0 - 90.0 mmHg     [facility]



                                         PATHOLOGY AND



                                         GENOMIC MEDICINE

 

   



                 O2 saturation, arterial     >100.0          95.0 - 100.0 %     [facility]



                                         PATHOLOGY AND



                                         GENOMIC MEDICINE

 

   



                 Base excess, arterial     1.3             mEq/L           [facility]



                                         PATHOLOGY AND



                                         GENOMIC MEDICINE

 

   



                 Bicarbonate     23.2            21.0 - 28.0 mEq/L     [facility]



                                         PATHOLOGY AND



                                         GENOMIC MEDICINE

 

   



                 O2 content      17.1            VOL%            [facility]



                                         PATHOLOGY AND



                                         GENOMIC MEDICINE

 

   



                 FiO2, inspired O2%     32.0            %               [facility]



                                         PATHOLOGY AND



                                         GENOMIC MEDICINE

 

   



                 Inspired O2 l/m     3               L/min           [facility]



                                         PATHOLOGY AND



                                         GENOMIC MEDICINE

 

   



                 Carboxyhemoglobin     0.2             0.0 - 1.4 %     [facility]



                           Comment:                  PATHOLOGY AND



                           Reference Ranges:         GENOMIC MEDICINE



                                         Carboxyhemoglobin  



                                         Non smoker: 0.0 - 2.0%  



                                         Smoker: 2.1 - 5.0%  



                                         Heavy smoker: 5.1 - 9%  

 

   



                 Methemoglobin     <0.0            0.0 - 1.0 %     [facility]



                                         PATHOLOGY AND



                                         GENOMIC MEDICINE

 

   



                 Hemoglobin, blood gas     12.0            12.0 - 16.0 g/dL     [facility]



                                         PATHOLOGY AND



                                         GENOMIC MEDICINE

 

   



                 pO2, A-a        24.5            mmHg            [facility]



                                         PATHOLOGY AND



                                         GENOMIC MEDICINE













                                         Specimen

 





                                         Blood









   



                 Performing Organization     Address         City/[facility]     Phone Number

 

   



                     [place] [address] 



                                         PATHOLOGY AND GENOMIC   



                                         MEDICINE   





* Lactic acid level (2018  3:20 AM CDT)



Only the most recent of 5 results within the time period is included.





   



                 Component       Value           Ref Range       Performed At

 

   



                 Lactic acid     1.8             0.5 - 2.2 mmol/L     [facility]



                                         PATHOLOGY AND



                                         GENOMIC MEDICINE













                                         Specimen

 





                                         Blood









   



                 Performing Organization     Address         City/[facility]     Phone Number

 

   



                     [place] [address] 



                                         PATHOLOGY AND GENOMIC   



                                         MEDICINE   





* Magnesium level (2018  4:16 AM CDT)





   



                 Component       Value           Ref Range       Performed At

 

   



                 Magnesium       1.80            1.60 - 2.40 mg/dL     [facility]



                                         PATHOLOGY AND



                                         GENOMIC MEDICINE













                                         Specimen

 





                                         Plasma specimen









   



                 Performing Organization     Address         City/[facility]     Phone Number

 

   



                     [place] [address] 



                                         PATHOLOGY AND GENOMIC   



                                         MEDICINE   





* Hepatic function panel (2018  4:16 AM CDT)





   



                 Component       Value           Ref Range       Performed At

 

   



                 Albumin         2.6 (L)         3.5 - 5.0 g/dL     [facility]



                                         PATHOLOGY AND



                                         GENOMIC MEDICINE

 

   



                 Total bilirubin     0.4             0.2 - 1.2 mg/dL     [facility]



                                         PATHOLOGY AND



                                         GENOMIC MEDICINE

 

   



                 Bilirubin direct     <0.2            0.0 - 0.4 mg/dL     [facility]



                                         PATHOLOGY AND



                                         GENOMIC MEDICINE

 

   



                 Alkaline phosphatase     93              0 - 104 U/L     [facility] [place]



                                         PATHOLOGY AND



                                         GENOMIC MEDICINE

 

   



                 Protein         6.8             6.3 - 8.3 g/dL     [facility] [facility]



                                         PATHOLOGY AND



                                         GENOMIC MEDICINE

 

   



                 ALT             15              5 - 50 U/L      [facility] [place] [facility]



                                         PATHOLOGY AND



                                         GENOMIC MEDICINE

 

   



                 AST             32              10 - 35 U/L     [facility] [facility]



                                         PATHOLOGY AND



                                         GENOMIC MEDICINE













                                         Specimen

 





                                         Plasma specimen









   



                 Performing Organization     Address         City/[facility]/Zipcode     Phone Number

 

   



                     [address] [address] 



                                         PATHOLOGY AND GENOMIC   



                                         MEDICINE   





* Troponin (2018  8:56 PM CDT)



Only the most recent of 3 results within the time period is included.





   



                 Component       Value           Ref Range       Performed At

 

   



                 Troponin        <0.30           0.00 - 0.30 ng/mL     [facility] [place]



                           Comment:                  PATHOLOGY AND



                           0.11 - 1.49               GENOMIC MEDICINE



                                         ng/mlMay  



                                         indicate increased risk of  



                                         acute  



                                           



                                          coronary  



                                         syndrome.  



                                           



                                           



                                         >=1.5  



                                         ng/ml  



                                         Consistent with acute  



                                         myocardial  



                                           



                                          infarction.  



                                           



                                           



                                           



                                           



                                           



                                         The diagnostic value of a  



                                         single normal or  



                                         non-diagnostic  



                                         result is  



                                         questionable.Serial  



                                         samples at 2-6 hour intervals  



                                         are required to rule out acute  



                                         myocardial  



                                         injury.  



                                           



                                           













                                         Specimen

 





                                         Plasma specimen









   



                 Performing Organization     Address         [facility]     Phone Number

 

   



                     [address] [place] [address] 



                                         PATHOLOGY AND GENOMIC   



                                         MEDICINE   





* Respiratory pathogen panel (2018  6:05 PM CDT)





   



                 Component       Value           Ref Range       Performed At

 

   



                     Respiratory pathogen     Positive for        [facility] DEPARTMENT OF



                     panel               Rhinovirus/Enterovirus      PATHOLOGY AND



                           -----------------------      GENOMIC MEDICINE



                                         Negative for all other  



                                         pathogens tested:  



                                         Negative for Adenovirus  



                                         Negative for Coronavirus HKU1  



                                         Negative for Coronavirus NL63  



                                         Negative for Coronavirus 229E  



                                         Negative for Coronavirus OC43  



                                         Negative for Human  



                                         Metapneumovirus  



                                         Negative for Influenza A  



                                         Negative for Influenza A/H1  



                                         Negative for Influenza A/H3  



                                         Negative for Influenza  



                                         A/H1-2009  



                                         Negative for Influenza B  



                                         Negative for Parainfluenza  



                                         Virus 1  



                                         Negative for Parainfluenza  



                                         Virus 2  



                                         Negative for Parainfluenza  



                                         Virus 3  



                                         Negative for Parainfluenza  



                                         Virus 4  



                                         Negative for Respiratory  



                                         Syncytial Virus  



                                         Negative for Bordetella  



                                         pertussis  



                                         Negative for Chlamydophila  



                                         pneumoniae  



                                         Negative for Mycoplasma  



                                         pneumoniae  



                                         This real-time PCR assay  



                                         detects the presence of  



                                         nucleic  



                                         acids (RNA or DNA) for the  



                                         respiratory pathogens  



                                         listed.  



                                         A result of "Not-detected"  



                                         does not exclude the  



                                         possibility  



                                         of the presence of one or more  



                                         pathogens at concentrations  



                                         less than the detectable  



                                         limits of the assa (A)  



                                         Comment:  



                                         Specimen Information  



                                         Specimen Source: Nares  



                                         Specimen Site: Right  













                                         Specimen

 





                                         Nares - Right









   



                 Performing Organization     Address         City/[facility]/Zipcode     Phone Number

 

   



                     [facility] DEPARTMENT OF     [address] [place] [address] 



                                         PATHOLOGY AND GENOMIC   



                                         MEDICINE   





* Lactic acid level, SEPSIS - Now and repeat 2x every 3 hours (2018  2:40 
  PM CDT)



Only the most recent of 2 results within the time period is included.





   



                 Component       Value           Ref Range       Performed At

 

   



                 Lactic acid     4.1 (HH)        0.5 - 2.2 mmol/L     [facility]



                           Comment:                  PATHOLOGY AND



                           Results called to and read      [facility]



                                         back by SOPHIA KWAN /  



                                         MEETA REYES NP  



                                         [id number] JGP  













                                         Specimen

 





                                         Blood









   



                 Performing Organization     Address         City/[facility]/Zipcode     Phone Number

 

   



                     [facility]     [address] [place] [address] 



                                         PATHOLOGY AND GENOMIC   



                                         MEDICINE   





* Echocardiogram complete w contrast and 3D if needed (2018  1:56 PM CDT)





   



                 Component       Value           Ref Range       Performed At

 

   



                 Velocity Ratio (V1/V2)     0.72            m/s             [facility]

 

   



                 IVS,d           1.15            0.6 - 1.2 cm     [facility]

 

   



                 EF              61.49           %               [facility]

 

   



                 LVPWD,d         1.09            cm              [facility]

 

   



                 AoV Mean PG     3.04            mmHg            [facility]

 

   



                 AV LVOT peak gradient     2.77            mmHg            [facility]

 

   



                 MV mean gradient     2.84            mmHg            [facility]

 

   



                 MV valve area p 1/2     6.90            cm2             [facility]



                                         method   

 

   



                 PV Pk Grad      3.60            mmHg            [facility]

 

   



                 E wave decelartion time     109.90          msec            [facility]

 

   



                 LVOT Diam,S     1.88            cm              [facility]

 

   



                 LVOT area       2.77            cm2             [facility]

 

   



                 LVOT Vmax       0.83            m/s             [facility]

 

   



                 LVOT VTI        0.14            m               [facility]

 

   



                 AoV Peak PG     5.27            mmHg            [facility]

 

   



                 MV Peak E Momo     1.15            m/s             [facility]

 

   



                 MV stenosis pressure 1/2     31.87           ms              [facility]



                                         time   

 

   



                 MV Peak A Momo     0.00            m/s             [facility]

 

   



                 Ao Root Diameter     2.84            cm              [facility]

 

   



                 AoV Area, Vmax     2.01            cm2             [facility]

 

   



                 AoV Area, VTI     1.79            cm2             [facility]

 

   



                 AoV Vmax        1.15            m/s             [facility]

 

   



                     IVS/LVPW,2D         1.06                [facility]

 

   



                 Left Atrium Dimension     3.19            cm              [facility]



                                         Anterior   

 

   



                 LV,d            3.88            cm              [facility]

 

   



                 LV,s            2.62            cm              [facility]

 

   



                 PV VMAX         0.95            m/s             [facility]

 

   



                 TR Vpeak        2.57            mm/s            [facility]

 

   



                 MV E A ratio     343.39          mmHg            [facility]

 

   



                 TR pk grad      23.92           mmHg            [facility]

 

   



                 MR peak grad     6.65            mmHg            [facility]

 

   



                 Ao Root Diameter     2.84            cm              [facility]

 

   



                 AR Press Half Time     482.24          ms              [facility]

 

   



                 LV SYS VOL      25.08           ml              [facility]

 

   



                 LV JASON VOL     65.13           ml              [facility]

 

   



                 LA area s A4C     10.30           cm2             [facility]

 

   



                 LA Vol MOD A4C     16.75           ml              [facility]

 

   



                 LV SV Teich 2D     40.05           ml              [facility]

 

   



                 LV Vol s Teich PSAX     25.08           ml              [facility]

 

   



                 LVOT CO         3.61            l/min           [facility]

 

   



                 LVOT HR for LVOT CO     92.19           bpm             [facility]

 

   



                 MV Vmax         1.29            m               [facility]

 

   



                 MV VTI Tips     0.18            m               [facility]

 

   



                     AoV Vmn             0.84                [facility]

 

   



                     AR slope            1.98                [facility]

 

   



                     Ar Vmax             3.30                [facility]

 

   



                     IVS s 2D            1.46                [facility]

 

   



                     LV FS Cube 2D       32.48               [facility]

 

   



                     LV FS Teich 2D      32.48               [facility]

 

   



                 AoV VTI         0.22            m               [facility]

 

   



                 LV EF,2D        69.22           %               [facility]

 

   



                     MV AE ratio         0.00                [facility]

 

   



                     LVOT Vmn            0.58                [facility]

 

   



                 Aov area Vmn     1.92            cm2             [facility]

 

   



                 LVOT mean grad     1.52            mmHg            [facility]

 

   



                     MAX Pred HR         138.13              [facility]

 

   



                     85 of MPHR          117.41              [facility]

 

   



                 AR DT           1,662.90        msec            [facility]

 

   



                 AR pk grad      43.51           mmHg            [facility]

 

   



                 Calc MPHR       138.13          bpm             [facility]

 

   



                 IVS pct thck PLAX     27.11           %               [facility]

 

   



                 LV SV Cube 2D     40.45           ml              [facility]

 

   



                 LV vol d cube 2D     58.44           ml              [facility]

 

   



                 LV vol s cube 2D     17.99           ml              [facility]

 

   



                 LVPW pct thck PLAX     32.48           %               [facility]

 

   



                 LVPW s PLAX     1.44            cm              [facility]

 

   



                 MV Decel slope     10.48           m/s2            [facility]

 

   



                     Pred Exer Dur R1     5.54                [facility]

 

   



                     Pred METS R1        4.06                [facility]









 



                           Narrative                 Performed At

 

 



                           This result has an attachment that is not available.     [facility]



                                          The left ventricle chamber size is normal. 



                                          Left Ventricular ejection fraction is 55 - 60%. 



                                          Trace aortic regurgitation. 



                                          Trace aortic regurgitation 









   



                 Performing Organization     Address         City/State/Zipcode     Phone Number

 

   



                      [facility]            [address] [place] [address] 





* CT Angiogram Pe Chest (2018 12:11 PM CDT)





 



                           Narrative                 Performed At

 

 



                           EXAMINATION: CT ANGIOGRAM PE CHEST      [facility]



                                         CLINICAL HISTORY: sob 



                                         TECHNIQUE:CT angiographic images of the chest are obtained during 



                                         intravenous administration of iodinated contrast. Computerized reformatted 



                                         images and 3-D images were also obtained and archived. CT pulmonary embolus 



                                         protocol. 



                                         CT scans are performed using radiation dose reduction techniques.Technical 





                                         factors are evaluated and adjusted to ensure appropriate moderation of 



                                         exposure.Automated dose management technology is applied to adjust radiation

 



                                         exposure while achieving a 



                                         diagnostic quality image. 



                                         COMPARISON: 2018 



                                         FINDINGS: The endotracheal tube and nasogastric tube are present. The thoracic 





                                         aorta has no aneurysmal dilatation. 



                                         The pulmonary arteries are well opacified. There is no evidence of any pulmonary

 



                                         embolism. The heart has no pericardial effusion. 



                                         The visualized portions of the liver, spleen are unremarkable. 



                                         There is a 6 cm calcified hematoma seen within the left adrenal gland. This was

 



                                         seen on prior studies and is unchanged. 



                                         The lung zones demonstrate mild emphysematous changes to be present. There is 



                                         mild compressive atelectasis seen at the lower lung bases. There is no pleural 





                                         effusion or pneumothorax. 



                                         IMPRESSION: 



                                         1. There is no evidence of any pulmonary embolism. 



                                         2. The thoracic aorta has no aneurysmal dilatation or dissection. Mild 



                                         atherosclerotic vascular changes are present. 



                                         3. The lung zones demonstrate emphysematous changes to be present. There is no 





                                         focal consolidation. 



                                         4. Compressive atelectasis is seen at the lower lung bases. 



                                         [facility]-[id number] 









                                        Procedure Note

 

                                        



 Interface, Radiology Results Incoming - 2018 12:25 PM CDT



EXAMINATION:   CT ANGIOGRAM PE CHEST



CLINICAL HISTORY:   sob



TECHNIQUE:  CT angiographic images of the chest are obtained during intravenous 
administration of iodinated contrast. Computerized reformatted images and 3-D 
images were also obtained and archived. CT pulmonary embolus protocol.



CT scans are performed using radiation dose reduction techniques.  Technical 
factors are evaluated and adjusted to ensure appropriate moderation of exposure.
 Automated dose management technology is applied to adjust radiation exposure 
while achieving a 

diagnostic quality image.



COMPARISON:   2018





FINDINGS: The endotracheal tube and nasogastric tube are present. The thoracic 
aorta has no aneurysmal dilatation.



The pulmonary arteries are well opacified. There is no evidence of any pulmonary
embolism. The heart has no pericardial effusion.



The visualized portions of the liver, spleen are unremarkable.



There is a 6 cm calcified hematoma seen within the left adrenal gland. This was 
seen on prior studies and is unchanged.



The lung zones demonstrate mild emphysematous changes to be present. There is 
mild compressive atelectasis seen at the lower lung bases. There is no pleural 
effusion or pneumothorax.







IMPRESSION:

                                        1. There is no evidence of any pulmonary embolism.

                                        2. The thoracic aorta has no aneurysmal dilatation or dissection. Mild atherosclerotic

 vascular changes are present.

                                        3. The lung zones demonstrate emphysematous changes to be present. There is no focal

 consolidation.

                                        4. Compressive atelectasis is seen at the lower lung bases.











[id number]











   



                 Performing Organization     Address         City/State/Zipcode     Phone Number

 

   



                     [facility]          [address] [place] [address] 





* CT Head Wo Contrast (2018 12:11 PM CDT)





 



                           Narrative                 Performed At

 

 



                           EXAMINATION: CT HEAD WO CONTRAST     [facility]



                                         CLINICAL HISTORY: HEADACHEACUTESEVERETHUNDERCLAPWORST LO OF LIFE

 



                                         COMPARISON:CT brain from 2018 



                                         TECHNIQUE: Noncontrast enhanced images of the brain were obtained from the skull

 



                                         base to the vertex. Both soft tissue and bone reconstruction algorithms were 



                                         performed.CT scans are performed using radiation dose reduction techniques.

 



                                         Technical factors 



                                         are evaluated and adjusted to ensure appropriate moderation of exposure. 



                                         Automated dose management technology is applied to adjust radiation exposure 



                                         while achieving a diagnostic quality image. 



                                         FINDINGS: 



                                         Artifacts obscure details. 



                                         There is no definite evidence of acute intracranial hemorrhage or mass, 



                                         hydrocephalus or midline shift, stroke or thrombus in the vessels. There are 



                                         relatively stable severe chronic changes in the brain including old infarcts in

 



                                         the cerebellum, left 



                                         parietal lobe, thalami, basal ganglia and possibly the gricelda. 



                                         There is an endotracheal tube and a nasogastric tube. 



                                         The orbits are unremarkable. There is mucosal thickening and partial 



                                         opacification of some of the sinuses. 



                                         IMPRESSION: 



                                         No acute intracranial abnormality identified. 



                                         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 



                                         Sinusitis which is improved compared with  year 



                                         [id number] 









                                        Procedure Note

 

                                        



 Interface, Radiology Results Incoming - 2018 12:16 PM CDT



EXAMINATION: CT HEAD WO CONTRAST



CLINICAL HISTORY: HEADACHE  ACUTE  SEVERE  THUNDERCLAP  WORST HA OF LIFE



COMPARISON:  CT brain from 2018



TECHNIQUE: Noncontrast enhanced images of the brain were obtained from the skull
base to the vertex. Both soft tissue and bone reconstruction algorithms were 
performed.  CT scans are performed using radiation dose reduction techniques. 
Technical factors 

are evaluated and adjusted to ensure appropriate moderation of exposure. 
Automated dose management technology is applied to adjust radiation exposure 
while achieving a diagnostic quality image.





FINDINGS:



Artifacts obscure details.



There is no definite evidence of acute intracranial hemorrhage or mass, 
hydrocephalus or midline shift, stroke or thrombus in the vessels. There are 
relatively stable severe chronic changes in the brain including old infarcts in 
the cerebellum, left 

parietal lobe, thalami, basal ganglia and possibly the gricelda.



There is an endotracheal tube and a nasogastric tube.



The orbits are unremarkable. There is mucosal thickening and partial 
opacification of some of the sinuses.





IMPRESSION:



No acute intracranial abnormality identified.



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



Sinusitis which is improved compared with January this year









[facility]-[id number]











   



                 Performing Organization     Address         City[facility]     Phone Number

 

   



                     [facility]          [address] [place] [address] 





* Blood culture, aerobic & anaerobic (2018  9:56 AM CDT)



Only the most recent of 2 results within the time period is included.





   



                 Component       Value           Ref Range       Performed At

 

   



                     Blood culture isolate     No growth after 5 days of      [facility] DEPARTMENT OF



                           incubation.               PATHOLOGY AND



                           Comment:                  GENOMIC MEDICINE



                                         Specimen Information  



                                         Specimen Source: Blood  



                                         Specimen Site: Hand Right  













                                         Specimen

 





                                         Blood









   



                 Performing Organization     Address         City[facility]     Phone Number

 

   



                     [facility]     [address] [place] [address] 



                                         PATHOLOGY AND GENOMIC   



                                         MEDICINE   





* Prothrombin time with INR (2018  9:51 AM CDT)





   



                 Component       Value           Ref Range       Performed At

 

   



                 Prothrombin time     14.7            12.0 - 15.0 sec     [facility] [facility]



                                         PATHOLOGY AND



                                         GENOMIC MEDICINE

 

   



                 INR             1.13 (H)        0.92 - 1.12     [facility]



                           Comment:                  PATHOLOGY AND



                           For patients on anticoagulant      GENOMIC MEDICINE



                                         therapy, reference ranges  



                                         below:  



                                         Indication:  



                                           



                                         INR Value  



                                         Treatment of Venous  



                                         Thrombosis,  



                                          2.0-3.0  



                                         pulmonary emboli, or  



                                         prophylaxis  



                                         of a venous thrombosis, or  



                                         systemic  



                                         emboli.  



                                         High dose, high risk  



                                         patients  



                                          3.0-4.5  



                                         with mechanical valves.  



                                         NOTE:INR values over 3.0  



                                         are sometimes associated with  



                                         gastrointestinal hemorrhage,  



                                         especially values over 4.0.  













                                         Specimen

 





                                         Blood









   



                 Performing Organization     Address         City[facility]     Phone Number

 

   



                     [facility]     [address] [place] [address] 



                                         PATHOLOGY AND GENOMIC   



                                         MEDICINE   





* B natriuretic peptide (2018  9:51 AM CDT)





   



                 Component       Value           Ref Range       Performed At

 

   



                 BNP             214 (H)         0 - 100 pg/mL     [facility]



                                         PATHOLOGY AND



                                         GENOMIC MEDICINE













                                         Specimen

 





                                         Blood









   



                 Performing Organization     Address         City[facility]     Phone Number

 

   



                     [place] [address] [place] [address] 



                                         PATHOLOGY AND GENOMIC   



                                         MEDICINE   





* Comprehensive metabolic panel (2018  9:51 AM CDT)





   



                 Component       Value           Ref Range       Performed At

 

   



                 Sodium          138             135 - 150 mEq/L     [facility]



                                         PATHOLOGY AND



                                         GENOMIC MEDICINE

 

   



                 Potassium       4.8             3.5 - 5.0 mEq/L     [facility]



                                         PATHOLOGY AND



                                         GENOMIC MEDICINE

 

   



                 Chloride        101             98 - 112 mEq/L     [facility]



                                         PATHOLOGY AND



                                         GENOMIC MEDICINE

 

   



                 CO2             21 (L)          24 - 31 mmol/L     [facility]



                                         PATHOLOGY AND



                                         GENOMIC MEDICINE

 

   



                 Anion gap       16@ANIO (H)     7 - 15 mEq/L     [facility]



                                         PATHOLOGY AND



                                         GENOMIC MEDICINE

 

   



                 BUN             30 (H)          7 - 18 mg/dL     [facility]



                                         PATHOLOGY AND



                                         GENOMIC MEDICINE

 

   



                 Creatinine      0.80            0.50 - 0.90 mg/dL     [facility]



                                         PATHOLOGY AND



                                         GENOMIC MEDICINE

 

   



                 Glucose         160 (H)         65 - 100 mg/dL     [facility]



                                         PATHOLOGY AND



                                         GENOMIC MEDICINE

 

   



                 Calcium         9.7             8.8 - 10.2 mg/dL     [facility]



                                         PATHOLOGY AND



                                         GENOMIC MEDICINE

 

   



                 Protein         7.7             6.3 - 8.3 g/dL     [facility]



                                         PATHOLOGY AND



                                         GENOMIC MEDICINE

 

   



                 Albumin         3.3 (L)         3.5 - 5.0 g/dL     [facility]



                                         PATHOLOGY AND



                                         GENOMIC MEDICINE

 

   



                 A/G ratio       0.8             0.7 - 3.8       [facility]



                                         PATHOLOGY AND



                                         GENOMIC MEDICINE

 

   



                 Alkaline phosphatase     109 (H)         0 - 104 U/L     [facility]



                                         PATHOLOGY AND



                                         GENOMIC MEDICINE

 

   



                 AST             25              10 - 35 U/L     [facility]



                                         PATHOLOGY AND



                                         GENOMIC MEDICINE

 

   



                 ALT             16              5 - 50 U/L      [facility]



                                         PATHOLOGY AND



                                         GENOMIC MEDICINE

 

   



                 Total bilirubin     0.8             0.2 - 1.2 mg/dL     [facility]



                                         PATHOLOGY AND



                                         GENOMIC MEDICINE













                                         Specimen

 





                                         Plasma specimen









   



                 Performing Organization     Address         City[facility]     Phone Number

 

   



                     [place] [address] [place] [address] 



                                         PATHOLOGY AND GENOMIC   



                                         MEDICINE   





* Urinalysis screen and microscopy, with reflex to culture (2018  9:50 AM 
  CDT)





   



                 Component       Value           Ref Range       Performed At

 

   



                     Specimen site       Lai               [facility]



                                         PATHOLOGY AND



                                         GENOMIC MEDICINE

 

   



                     Color, UA           Yellow              [facility]



                                         PATHOLOGY AND



                                         GENOMIC MEDICINE

 

   



                     Appearance, UA      Slightly-Cloudy      [facility]



                                         PATHOLOGY AND



                                         GENOMIC MEDICINE

 

   



                 Specific gravity, UA     1.014           1.001 - 1.035     [facility]



                                         PATHOLOGY AND



                                         GENOMIC MEDICINE

 

   



                 pH, UA          8.0             5.0 - 8.5       [facility]



                                         PATHOLOGY AND



                                         GENOMIC MEDICINE

 

   



                 Protein, UA     1+ (A)          Negative        [facility]



                                         PATHOLOGY AND



                                         GENOMIC MEDICINE

 

   



                 Glucose, UA     Negative        Negative        [facility]



                                         PATHOLOGY AND



                                         GENOMIC MEDICINE

 

   



                 Ketones, UA     Negative        Negative        [facility]



                                         PATHOLOGY AND



                                         GENOMIC MEDICINE

 

   



                 Bilirubin, UA     Negative        Negative        [facility]



                                         PATHOLOGY AND



                                         GENOMIC MEDICINE

 

   



                 Blood, UA       Negative        Negative        [facility]



                                         PATHOLOGY AND



                                         GENOMIC MEDICINE

 

   



                 Nitrite, UA     Negative        Negative        [facility]



                                         PATHOLOGY AND



                                         GENOMIC MEDICINE

 

   



                 Urobilinogen, UA     4.0 (A)         <2.0            [facility]



                                         PATHOLOGY AND



                                         GENOMIC MEDICINE

 

   



                 Leukocyte esterase, UA     Trace (A)       Negative        [facility]



                                         PATHOLOGY AND



                                         GENOMIC MEDICINE

 

   



                 Epithelial cells, UA     Few             /HPF            [facility]



                                         PATHOLOGY AND



                                         GENOMIC MEDICINE

 

   



                 WBC, UA         13 (H)          0 - 5 /HPF      [facility]



                                         PATHOLOGY AND



                                         GENOMIC MEDICINE

 

   



                 RBC, UA         2               0 - 5 /HPF      [facility]



                                         PATHOLOGY AND



                                         GENOMIC MEDICINE

 

   



                 Bacteria, UA     Trace           None seen       [facility]



                                         PATHOLOGY AND



                                         GENOMIC MEDICINE

 

   



                     Yeast, UA           None seen           [facility]



                                         PATHOLOGY AND



                                         GENOMIC MEDICINE

 

   



                     Yeast with pseudohyphae,     None seen           [facility] OF



                           UA                        PATHOLOGY AND



                                         GENOMIC MEDICINE













                                         Specimen

 





                                         Urine









   



                 Performing Organization     Address         City/State/Zipcode     Phone Number

 

   



                     [facility]      [address] [place] [address] 



                                         PATHOLOGY AND GENOMIC   



                                         MEDICINE   





* Gram stain (2018  9:50 AM CDT)





   



                 Component       Value           Ref Range       Performed At

 

   



                     Gram stain result     Rare WBC's          [facility] DEPARTMENT OF



                           Few Gram positive cocci in      PATHOLOGY AND



                           pairs                     GENOMIC MEDICINE



                                         Comment:  



                                         Specimen Information  



                                         Specimen Source: Urine  



                                         Specimen Site: Lai  













                                         Specimen

 





                                         Urine - Lai









   



                 Performing Organization     Address         City/State/Zipcode     Phone Number

 

   



                     [facility] DEPARTMENT OF     [address] [place] [address] 



                                         PATHOLOGY AND GENOMIC   



                                         MEDICINE   





* Urine culture (2018  9:50 AM CDT)





   



                 Component       Value           Ref Range       Performed At

 

   



                     Urine culture isolate     Mixed Gram positive miguelito      [facility] DEPARTMENT OF



                           10-1 cfu/ml               PATHOLOGY AND



                           (A)                       GENOMIC MEDICINE



                                         Comment:  



                                         Specimen Information  



                                         Specimen Source: Urine  



                                         Specimen Site: Lai  













                                         Specimen

 





                                         Urine - Lai









   



                 Performing Organization     Address         City/[facility]     Phone Number

 

   



                     [facility] DEPARTMENT OF     [address] [place] [address] 



                                         PATHOLOGY AND GENOMIC   



                                         MEDICINE   





* ECG ED Preliminary Interpretation - NOT AN ORDER (2018  9:41 AM CDT)





 



                           Narrative                 Performed At

 

 



                                         Cherri Diaz MD 20189:41 AM 



                                         ECG ED Preliminary Interpretation - Not an Order 



                                         Performed by: CHERRI DIAZ 



                                         Authorized by: CHERRI DIAZ 



                                         ECG reviewed by ED Physician in the absence of a cardiologist: yes 



                                         Previous ECG: 



                                         Previous ECG:Compared to current 



                                         Interpretation: 



                                         Interpretation: normal 



                                         Rate: 



                                         ECG rate:108 



                                         ECG rate assessment: tachycardic 



                                         Rhythm: 



                                         Rhythm: sinus rhythm and sinus tachycardia 



                                         Ectopy: 



                                         Ectopy: none 



                                         QRS: 



                                         QRS axis:Normal 



                                         Conduction: 



                                         Conduction: normal 



                                         ST segments: 



                                         ST segments:Normal 



                                         T waves: 



                                         T waves: normal 





* ECG 12 lead (2018  9:38 AM CDT)





   



                 Component       Value           Ref Range       Performed At

 

   



                     Ventricular rate     108                 [facility]

 

   



                     Atrial rate         108                 [facility]

 

   



                     KY interval         188                 [facility]

 

   



                     QRSD interval       106                 [facility]

 

   



                     QT interval         356                 [facility]

 

   



                     QTC interval        477                 [facility]

 

   



                     P axis 1            77                  [facility]

 

   



                     QRS axis 1          -29                 [facility]

 

   



                     T wave axis         60                  [facility]

 

   



                     EKG impression      Sinus tachycardia-Inferior      [facility]



                                         infarct , age  



                                         undetermined-Abnormal ECG-No  



                                         previous ECGs  



                                         available-Electronically  



                                         Signed By Jair Cain MD ([id number]) on  



                                         2018 2:42:19 PM  









   



                 Performing Organization     Address         [facility]     Phone Number

 

   



                     [facility]            [address] [place] [address] 





* INTUBATION (2018  8:58 AM CDT)





 



                           Narrative                 Performed At

 

 



                                         Cherri Diaz MD 2018 11:28 AM 



                                         Intubation 



                                         Performed by: CHERRI DIAZ 



                                         Authorized by: CHERRI DIAZ 



                                         Consent: 



                                         Consent obtained:Emergent situation 



                                         Consent given by: son. 



                                         Risks discussed:Aspiration, bleeding, death, brain injury, dental 



                                         trauma, hypoxia, laryngeal injury and pneumothorax 



                                         Alternatives discussed:No treatment, delayed treatment, alternative 



                                         treatment, observation and referral 



                                         Universal protocol: 



                                         Procedure explained and questions answered to patient or proxy's 



                                         satisfaction: yes 



                                         Relevant documents present and verified: yes 



                                         Test results available and properly labeled: yes 



                                         Imaging studies available: yes 



                                         Required blood products, implants, devices, and special equipment 



                                         available: yes 



                                         Site/side marked: yes 



                                         Immediately prior to procedure, a time out was called: yes 



                                         Patient identity confirmed:Arm band 



                                         Pre-procedure details: 



                                         Patient status:Altered mental status 



                                         Mallampati score:3 



                                         Pretreatment medications:Lidocaine 



                                         Induction:Etomidate 



                                         Paralytics:Succinylcholine 



                                         Procedure details: 



                                         Preoxygenation:Bag valve mask 



                                         CPR in progress: no 



                                         Intubation method:Oral 



                                         Technique:Video laryngoscopy 



                                         Laryngoscope blade:Mac 3 



                                         Grade view:3 



                                         Difficult airway?: Yes 



                                         Tube size (mm):7.5 



                                         Tube type:Cuffed 



                                         Number of attempts:1 



                                         Ventilation between attempts: no 



                                         Cricoid pressure: yes 



                                         Tube visualized through cords: yes 



                                         Placement assessment: 



                                         ETT to lip:23 



                                         ETT to teeth:23 



                                         Tube secured with:ETT chavez 



                                         Breath sounds:Equal 



                                         Placement verification: chest rise and CXR verification 



                                         CXR findings:ETT in proper place 



                                         Post-procedure details: 



                                         Patient tolerance of procedure:Tolerated well, no immediate 



                                         complications 





* XR Abdomen 1 Vw Portable (2018  8:51 AM CDT)





 



                           Narrative                 Performed At

 

 



                           EXAMINATION:XR ABDOMEN 1 VW PORTABLE     [facility]



                                         CLINICAL HISTORY:feeding tube placement 



                                         COMPARISON:None. 



                                         IMPRESSION: 



                                         Gastrostomy tube is located in the gastric fundus. Contrast was administered 



                                         which opacifies the gastric lumen and demonstrates mild gastroesophageal reflux.

 



                                         Bowel gas pattern is nonobstructive. There is no gross intra-abdominal free air.

 



                                         Eggshell 



                                         calcification related to the left adrenal gland noted in the left upper abdomen.

 



                                         There is vascular calcification. 



                                         [facility]-[id number] 









                                        Procedure Note

 

                                        



 Interface, Radiology Results Incoming - 2018  9:01 AM CDT



EXAMINATION:  XR ABDOMEN 1 VW PORTABLE



CLINICAL HISTORY:  feeding tube placement



COMPARISON:  None.



IMPRESSION:



Gastrostomy tube is located in the gastric fundus. Contrast was administered 
which opacifies the gastric lumen and demonstrates mild gastroesophageal reflux.
Bowel gas pattern is nonobstructive. There is no gross intra-abdominal free air.
Eggshell 

calcification related to the left adrenal gland noted in the left upper abdomen.
There is vascular calcification.





[facility]-[id number]











   



                 Performing Organization     Address         City/State/Zipcode     Phone Number

 

   



                     [facility]          [address] [place] [address] 





* Feeding tube replacement (2018  8:13 AM CDT)





 



                           Narrative                 Performed At

 

 



                                         Cherri Diaz MD 20182:14 PM 



                                         GI Tubes 



                                         Performed by: CHERRI DIAZ 



                                         Authorized by: CHERRI DIAZ 



                                         Consent: 



                                         Consent obtained:Emergent situation 



                                         Consent given by:Healthcare agent 



                                         Universal protocol: 



                                         Patient identity confirmed:Arm band 



                                         Pre-procedure details: 



                                         Old tube type:Gastrostomy 



                                         Old tube size:24 Fr 



                                         Anesthesia (see MAR for exact dosages): 



                                         Anesthesia method:None 



                                         Procedure details: 



                                         Patient position:Sitting 



                                         Procedure type:Replacement 



                                         Fluoro Guidance?: No 



                                         Tube type:Gastrostomy 



                                         Tube size:24 Fr 



                                         Bulb inflation volume:10 



                                         Bulb inflation fluid:Normal saline 



                                         Post-procedure details: 



                                         Placement difficulty:None 



                                         Bleeding:None 



                                         Patient tolerance of procedure:Tolerated well, no immediate 



                                         complications 



                                         Comments: 



                                          X-ray is ordered, no results yet. 





after 2018



Insurance







     



            Payer      Benefit     Subscriber ID     Type       Phone      Address



                                         Plan /    



                                         Group    

 

     



              MEDICARE     MEDICARE     xxxxxxxxxx     Medicare [place], [place]



                                         PART A AND    



                                         B    









     



            Guarantor Name     Account     Relation to     Date of     Phone      Billing Address



                     Type                Patient             Birth  

 

     



            Vianca Jarquin     Personal/F     Self       1935     [phone] [address]               (Home)              [place] [address]







Advance Directives





Patient has advance care planning documents on file. For more information, christine ho contact:



Spencer Guzmán



[address] [place] [address]

## 2019-03-12 NOTE — NUR
DIDI FROM LAB CALLED TO REPORT CRITICAL LACTIC ACID 48.8. INFORMED DR. ARNOLD 
AND DANIEL RN PRIMARY NURSE OF THIS.

## 2019-03-12 NOTE — DIAGNOSTIC IMAGING REPORT
EXAMINATION:  CHEST SINGLE (PORTABLE)    



INDICATION: Sepsis. 



COMPARISON:  None

     

FINDINGS:

TUBES and LINES:  None.



LUNGS: Low lung volumes. Mild patchy bibasilar opacities. No evidence of lobar

consolidation or pulmonary edema.



PLEURA:  No pleural effusion or pneumothorax. 



HEART AND MEDIASTINUM:  The cardiomediastinal silhouette is unremarkable. There

are atherosclerotic calcifications within the aorta.



BONES AND SOFT TISSUES:  No acute osseous abnormality.



UPPER ABDOMEN: No free air under the diaphragm.    



IMPRESSION: 

Low lung volumes with patchy bibasilar opacities, which may reflect atelectasis

or early pneumonia in the appropriate clinical setting. Follow-up chest

radiograph is suggested to assess for resolution.



Signed by: Dr. Rigoberto Bains MD on 3/12/2019 1:17 PM

## 2019-03-13 VITALS — DIASTOLIC BLOOD PRESSURE: 50 MMHG | SYSTOLIC BLOOD PRESSURE: 137 MMHG

## 2019-03-13 VITALS — DIASTOLIC BLOOD PRESSURE: 124 MMHG | SYSTOLIC BLOOD PRESSURE: 139 MMHG

## 2019-03-13 VITALS — SYSTOLIC BLOOD PRESSURE: 144 MMHG | DIASTOLIC BLOOD PRESSURE: 68 MMHG

## 2019-03-13 VITALS — SYSTOLIC BLOOD PRESSURE: 152 MMHG | DIASTOLIC BLOOD PRESSURE: 64 MMHG

## 2019-03-13 VITALS — DIASTOLIC BLOOD PRESSURE: 86 MMHG | SYSTOLIC BLOOD PRESSURE: 158 MMHG

## 2019-03-13 VITALS — DIASTOLIC BLOOD PRESSURE: 56 MMHG | SYSTOLIC BLOOD PRESSURE: 156 MMHG

## 2019-03-13 VITALS — SYSTOLIC BLOOD PRESSURE: 116 MMHG | DIASTOLIC BLOOD PRESSURE: 52 MMHG

## 2019-03-13 VITALS — DIASTOLIC BLOOD PRESSURE: 63 MMHG | SYSTOLIC BLOOD PRESSURE: 169 MMHG

## 2019-03-13 VITALS — SYSTOLIC BLOOD PRESSURE: 140 MMHG | DIASTOLIC BLOOD PRESSURE: 105 MMHG

## 2019-03-13 VITALS — DIASTOLIC BLOOD PRESSURE: 79 MMHG | SYSTOLIC BLOOD PRESSURE: 154 MMHG

## 2019-03-13 VITALS — SYSTOLIC BLOOD PRESSURE: 154 MMHG | DIASTOLIC BLOOD PRESSURE: 79 MMHG

## 2019-03-13 VITALS — SYSTOLIC BLOOD PRESSURE: 159 MMHG | DIASTOLIC BLOOD PRESSURE: 79 MMHG

## 2019-03-13 VITALS — SYSTOLIC BLOOD PRESSURE: 136 MMHG | DIASTOLIC BLOOD PRESSURE: 77 MMHG

## 2019-03-13 VITALS — DIASTOLIC BLOOD PRESSURE: 72 MMHG | SYSTOLIC BLOOD PRESSURE: 176 MMHG

## 2019-03-13 VITALS — SYSTOLIC BLOOD PRESSURE: 148 MMHG | DIASTOLIC BLOOD PRESSURE: 57 MMHG

## 2019-03-13 VITALS — SYSTOLIC BLOOD PRESSURE: 160 MMHG | DIASTOLIC BLOOD PRESSURE: 59 MMHG

## 2019-03-13 VITALS — DIASTOLIC BLOOD PRESSURE: 68 MMHG | SYSTOLIC BLOOD PRESSURE: 166 MMHG

## 2019-03-13 VITALS — SYSTOLIC BLOOD PRESSURE: 156 MMHG | DIASTOLIC BLOOD PRESSURE: 62 MMHG

## 2019-03-13 VITALS — SYSTOLIC BLOOD PRESSURE: 148 MMHG | DIASTOLIC BLOOD PRESSURE: 70 MMHG

## 2019-03-13 VITALS — SYSTOLIC BLOOD PRESSURE: 120 MMHG | DIASTOLIC BLOOD PRESSURE: 57 MMHG

## 2019-03-13 VITALS — DIASTOLIC BLOOD PRESSURE: 57 MMHG | SYSTOLIC BLOOD PRESSURE: 133 MMHG

## 2019-03-13 VITALS — SYSTOLIC BLOOD PRESSURE: 168 MMHG | DIASTOLIC BLOOD PRESSURE: 73 MMHG

## 2019-03-13 VITALS — SYSTOLIC BLOOD PRESSURE: 162 MMHG | DIASTOLIC BLOOD PRESSURE: 58 MMHG

## 2019-03-13 VITALS — SYSTOLIC BLOOD PRESSURE: 136 MMHG | DIASTOLIC BLOOD PRESSURE: 70 MMHG

## 2019-03-13 VITALS — SYSTOLIC BLOOD PRESSURE: 143 MMHG | DIASTOLIC BLOOD PRESSURE: 56 MMHG

## 2019-03-13 VITALS — DIASTOLIC BLOOD PRESSURE: 91 MMHG | SYSTOLIC BLOOD PRESSURE: 137 MMHG

## 2019-03-13 VITALS — DIASTOLIC BLOOD PRESSURE: 43 MMHG | SYSTOLIC BLOOD PRESSURE: 106 MMHG

## 2019-03-13 LAB
ANION GAP SERPL CALC-SCNC: 14.1 MMOL/L (ref 8–16)
BASOPHILS # BLD AUTO: 0 10*3/UL (ref 0–0.1)
BASOPHILS NFR BLD AUTO: 0.3 % (ref 0–1)
BUN SERPL-MCNC: 25 MG/DL (ref 7–26)
BUN/CREAT SERPL: 31 (ref 6–25)
CALCIUM SERPL-MCNC: 7.7 MG/DL (ref 8.4–10.2)
CHLORIDE SERPL-SCNC: 106 MMOL/L (ref 98–107)
CK MB SERPL-MCNC: 1 NG/ML (ref 0–5)
CK MB SERPL-MCNC: 1.2 NG/ML (ref 0–5)
CK SERPL-CCNC: 63 IU/L (ref 29–168)
CK SERPL-CCNC: 63 IU/L (ref 29–168)
CO2 SERPL-SCNC: 22 MMOL/L (ref 22–29)
DEPRECATED NEUTROPHILS # BLD AUTO: 8.7 10*3/UL (ref 2.1–6.9)
EGFRCR SERPLBLD CKD-EPI 2021: > 60 ML/MIN (ref 60–?)
EOSINOPHIL # BLD AUTO: 0 10*3/UL (ref 0–0.4)
EOSINOPHIL NFR BLD AUTO: 0.1 % (ref 0–6)
ERYTHROCYTE [DISTWIDTH] IN CORD BLOOD: 13.5 % (ref 11.7–14.4)
GLUCOSE SERPLBLD-MCNC: 92 MG/DL (ref 74–118)
HCT VFR BLD AUTO: 37.1 % (ref 34.2–44.1)
HGB BLD-MCNC: 11.8 G/DL (ref 12–16)
LYMPHOCYTES # BLD: 0.4 10*3/UL (ref 1–3.2)
LYMPHOCYTES NFR BLD AUTO: 4.5 % (ref 18–39.1)
MCH RBC QN AUTO: 31 PG (ref 28–32)
MCHC RBC AUTO-ENTMCNC: 31.8 G/DL (ref 31–35)
MCV RBC AUTO: 97.4 FL (ref 81–99)
MONOCYTES # BLD AUTO: 0.3 10*3/UL (ref 0.2–0.8)
MONOCYTES NFR BLD AUTO: 3.3 % (ref 4.4–11.3)
NEUTS SEG NFR BLD AUTO: 91.3 % (ref 38.7–80)
PLATELET # BLD AUTO: 181 X10E3/UL (ref 140–360)
POTASSIUM SERPL-SCNC: 3.1 MMOL/L (ref 3.5–5.1)
RBC # BLD AUTO: 3.81 X10E6/UL (ref 3.6–5.1)
SODIUM SERPL-SCNC: 139 MMOL/L (ref 136–145)

## 2019-03-13 RX ADMIN — CEFTRIAXONE SCH MLS/HR: 100 INJECTION, POWDER, FOR SOLUTION INTRAVENOUS at 00:20

## 2019-03-13 RX ADMIN — RIVAROXABAN SCH MG: 15 TABLET, FILM COATED ORAL at 21:44

## 2019-03-13 RX ADMIN — SODIUM CHLORIDE SCH MLS/HR: 9 INJECTION, SOLUTION INTRAVENOUS at 00:14

## 2019-03-13 RX ADMIN — Medication SCH ML: at 02:20

## 2019-03-13 RX ADMIN — METOPROLOL TARTRATE SCH MG: 25 TABLET, FILM COATED ORAL at 14:48

## 2019-03-13 RX ADMIN — SODIUM CHLORIDE SCH MLS/HR: 9 INJECTION, SOLUTION INTRAVENOUS at 03:21

## 2019-03-13 RX ADMIN — CEFTRIAXONE SCH MLS/HR: 100 INJECTION, POWDER, FOR SOLUTION INTRAVENOUS at 21:44

## 2019-03-13 RX ADMIN — Medication SCH ML: at 00:00

## 2019-03-13 RX ADMIN — Medication SCH ML: at 07:01

## 2019-03-13 RX ADMIN — CLINDAMYCIN PHOSPHATE SCH MLS/HR: 18 INJECTION, SOLUTION INTRAVENOUS at 08:10

## 2019-03-13 RX ADMIN — Medication SCH ML: at 11:15

## 2019-03-13 RX ADMIN — SODIUM CHLORIDE SCH MLS/HR: 9 INJECTION, SOLUTION INTRAVENOUS at 09:50

## 2019-03-13 RX ADMIN — NORTRIPTYLINE HYDROCHLORIDE SCH MG: 25 CAPSULE ORAL at 21:44

## 2019-03-13 RX ADMIN — CLINDAMYCIN PHOSPHATE SCH MLS/HR: 18 INJECTION, SOLUTION INTRAVENOUS at 14:00

## 2019-03-13 RX ADMIN — LEVALBUTEROL HYDROCHLORIDE SCH MG: 0.63 SOLUTION RESPIRATORY (INHALATION) at 20:25

## 2019-03-13 RX ADMIN — CLINDAMYCIN PHOSPHATE SCH MLS/HR: 18 INJECTION, SOLUTION INTRAVENOUS at 22:13

## 2019-03-13 NOTE — NUR
CASE MANAGEMENT INITIAL ASSESSMENT 

 to bedside to discuss plan of care with patient/family. CM/SW role and care 
transitions discussed. Anticipated discharge plan discussed along with duration of care. 
CM/SW discussed patients right to make decisions in care.  CM/SW work hours given.  

Patient lives: IN [facility] [place] (long-term)

Admit/Transfer: ER

Hospital/ER visits since last admit:NONE

POA/Emergency contact: KLARISSA PUENTE  [phone]

Current/Previous Home Health: NONE

PCP/Follow-up Care: NURSING HOME DOCTOR

Current/Previous DME: NONE

Medications (referring to index hospitalization or the first time you were in the 
hospital)



a. Were changes made in your medications when you were in the hospital on [date of index 
hospitalization]?   Yes       No        Not sure       Explain:

Note: If no or not sure, please skip to question d



b. Did you understand the changes?  Yes    No   Explain:



c. Were you able to obtain your new medications right away?  Yes    No  n/a SNF only

 Explain:



d. Were you able to take your medications like the doctor wanted you to? MEDS GIVEN IN 
NURSING HOME



e. Did the hospital give you an accurate, easy to understand list of medications when you 
left? 





Scale of 1-10 how comfortable does patient feel with disease management in outpatient 
setting: MANAGED AT NURSING HOME

Other Services: NONE

Employment Status: RETIRED

Areas of Concerns: NONE; PLAN PER SONHUGO IS TO RETURN TO [facility] IN [place] 
UPON DISCHARGE

Referral Needs: NONE

Education Needs: NONE

IMM/MOON given and signed (if applicable): IMM ON ADMIT

Goal for discharge: TO RETURN TO [facility] UPON DISCHARGE

CM/SW left business card at the bedside with contact information. Name and number was also 
written on the patients whiteboard. Patient verbalized understanding of discussion. CM will 
follow-up with ongoing discharge and transition of care needs.

## 2019-03-13 NOTE — NUR
CALLED DR AMEZCUA ANSWERING SERVICE SPOKE TO [place] TO INFORM OF PATIENT CONSULTATION. 
ORDERING MD DR MENDOZA NEEDING CONSULT FOR DR AMEZCUA DX SEPSIS

## 2019-03-13 NOTE — HISTORY AND PHYSICAL
CHIEF COMPLAINT:  Altered mental status.

 

HISTORY OF PRESENT ILLNESS:  This is an 83-year-old  female, who has baseline

dementia, also history of CVA in the past about 3 years ago, and according to the son,

who progressively has gotten worse.  Currently, lives in a nursing home due to medical

debilitation and was brought in from [facility] due to underlying

metabolic encephalopathy and confusion.  Information being obtained is by the son who I

spoke with him over the phone.  The patient's son reports that over the last several

days he has noted that his mom was not eating as much as usual.  He always attributes

this to some sort of infection, as a urinary tract infection.  He reports no fever at

home.  No cough, no congestion.  No reports of any chest pain according to him.  He was

told yesterday by the nursing home that his mother had too much fluid around her "lungs"

and was told to come to the hospital for further evaluation.  The patient was brought in

here to [facility] for further management and care.  The patient

was found to have underlying sepsis due to UTI and possible aspiration pneumonia.  The

patient was seen and evaluated at bedside in the ICU.  Currently, she is doing well.

She is stable.  She is not talking, but I believe this is at her baseline. 

 

REVIEW OF SYSTEMS:

According to the son, she has some confusion, dementia, decreased oral intake. 

 

Unable to obtain the rest of the 14-point review of systems as the patient has baseline

dementia. 

 

ALLERGIES:  DAIRY, PENICILLIN, SULFA, AND EGG.

 

HOME MEDICATIONS:  Please see med reconciliation form.

 

PAST MEDICAL HISTORY:  She has Alzheimer's and has baseline dementia, history of urinary

tract infections in the past, hypotension, history of CVAs in the past. 

 

PAST SURGICAL HISTORY:  Reports none.

 

FAMILY HISTORY:  Hypertension and diabetes.

 

SOCIAL HISTORY:  Lives in a nursing home.  No report of drugs, alcohol, or any smoking

history. 

 

LABS:  White count on admission is 16.2, now 9.5; hemoglobin 11.8; hematocrit 37;

platelets of 181.  Chemistry, sodium 139, potassium 3.1, chloride 106, bicarb 23, anion

gap of 14, BUN is 25, creatinine is 0.8, glucose is 92.  On admission, lactic acid was

46 then went to 23.9 then 22.6, calcium is 7.7.  LFTs were normal.  Albumin was 2.9.

Troponins were negative.  Urinalysis consistent with UTI. 

 

MICROBIOLOGY:  Urine cultures were positive for gram-negative rods.  Sputum cultures

were positive for gram-negative rods.  Blood cultures, no growth to date. 

 

IMAGING STUDIES:  Chest x-ray, low lung volumes.  Bibasilar opacities.  Concerning for

underlying aspiration pneumonia.  Repeat chest x-ray this morning still shows similar

findings. 

 

PHYSICAL EXAMINATION:

VITAL SIGNS:  Temperature 98.7, pulse is 122, respiratory rate is 18, blood pressure is

137/91, and saturating 100% on nasal cannula 2 L. 

GENERAL:  She has baseline dementia, unoriented. 

HEENT:  Head is normocephalic, atraumatic.  Eyes, pupils are equal, round, and reactive

to light bilaterally.  Extraocular movements are intact bilaterally.  Throat, no

evidence of erythema or exudate in the posterior pharynx.  Has poor dentition. 

NECK:  Supple.  Good range of motion throughout. 

PULMONARY:  Clear to auscultation bilaterally.  No wheezing.  No rales.  No rhonchi.  No

crackles appreciated. 

CARDIOVASCULAR:  Positive S1, S2.  No murmur, rub, or gallop appreciated. 

ABDOMEN:  Soft, nondistended, nontender to palpation.  Bowel sounds present. 

MUSCULOSKELETAL:  Unable to assess, currently demented. 

NEUROLOGIC:  Unable to assess, currently demented. 

SKIN:  Intact.  Warm to touch.  Good cap refill. 

PSYCHIATRIC:  Currently demented, unable to assess. 

EXTREMITIES:  No edema.  Good range of motion throughout.

IMPRESSION:  

1. Sepsis secondary to underlying urinary tract infection as well as aspiration

pneumonia. 

2. Urinary tract infection.

3. Aspiration pneumonitis.

4. Moderate protein-calorie malnutrition with a PEG tube.

5. Baseline dementia.

6. History of atrial fibrillation, on anticoagulation and rate control medications.

7. History of cerebrovascular accident in the past.

 

PLAN:  At this time, urine cultures were positive.  Blood cultures, no growth.  Sputum

cultures were positive.  Continue with IV antibiotics.  ID was consulted.  We are going

to get a swallow evaluation as well as a modified barium swallow as indicated hopefully

tomorrow.  She does have a PEG tube, which we will go ahead and start her on tube feeds

today.  We will get nutrition evaluation as well.  Lactic acid was still slightly

elevated.  We will get repeat labs including lactic acid from the morning.  CT brain was

never performed by the ER, which we will go ahead and get that as a complete workup for

her encephalopathy.  She is on Xarelto for history of AFib and that will be continued

here for DVT prophylaxis as well.  We are going to resume same home medications with no

changes.  Get PT and OT to work with the patient as well.  I had a long discussion with

the son, Yasmany, by phone, in which I discussed overall plans of care and goals of care of

his mother.  At this time, he reports that he does have medical power of 

paperwork, which he will bring here to the hospital.  He wants his mother to be DNR/DNI.

 This was verified also by the nursing staff.  At this time, DNR/DNI had been placed

into the charts as per his wishes.  We will continue with symptomatic care for now and

we will monitor very closely. 

 

I spent more than 40 minutes of critical care time on this case.

 

 

 

 

______________________________

MD NURA House/JESUS

D:  03/13/2019 14:14:23

T:  03/13/2019 19:51:19

Job #:  [id number]

## 2019-03-13 NOTE — NUR
Nutrition Intervention Note



RD Recommendation(s) for Physician: 

-Rec to liberalize diet to regular; diet texture per SLP

-Rec Ensure Enlive BID (700kcal, 40g protein) to promote protein-calorie intake

-Rec continuous TF with Jevity 1.2 @55mL/hr for 10hr (8pm-6am) via PEG, providing 660kcal, 
30g protein, and 444mL water  

-Rec 20mL/hr of free water flushes for 10hr; additional per MD discretion    

-Check labs, weight and GI tolerance 



Plan of Care: RD following, monitoring for tolerance and adequacy, TF rec  



Nutrition reason for involvement:

RN consult 



RD Assessment

3/13  Chart reviewed. 84yo F, who was admitted for aspiration PNA. Pt came from a nursing 
home with PEG tube POA. Per DARIN Garcia, pt ate 50% of her oatmeal and was coughing after 
drinking milk this AM. Pt was able to tolerate soft foods alright. Per son, pt was on a 
pureed diet with Ensure at the nursing home. Pt also got supplemental EN (bolus feeding of 
4x 250cc Fibersource) through PEG at night time if her PO intake during the day was low.   
No GI complains noted. Per son, pt was having significant weight loss due to ill-fitting 
denture about 8 months ago. Since the denture was removed, pt has been maintaining her 
weight at 110lb. SLP has been consulted. Spoke to Dr. Tee regarding RD recommendation. Dr. Tee agreed with plan; order has been placed. Will continue to monitor and follow. 



Principal Problems/Diagnoses: Aspiration PNA 



PMH: HTN, MI, CVA, hypothyroidism, dementia, stroke, CVA w/ R side weakness



GI: abdomen soft, non-tender, round, LBM  3/13 



Skin: No pressure wound noted 



Labs: (3/13) K 3.1 L, Ca 7.7 L 



Meds:  abx, NaCl 



Ht: 63in (per observation, no weight taken)

Wt: 113.31lb

BMI: 20kg/m2

IBW: 115lb 



Malnutrition Evaluation (3/13/2019)

The patient does not meet criteria for a specified degree of malnutrition at this time. Will 
re-evaluate at follow-up as appropriate. 



Nutrition Prescription (Diet Order): cardiac diet 



Estimated Nutritional Needs:

Calories:  1300  1820kcal (25-35kcal/kg/d) Weight used: Current BW 

Protein:  52  78g (1-1.5g/kg/d) Weight used: Current BW



Diet Adequacy:

Not meeting calorie needs, Not meeting protein needs





Diet Education Needs Assessment:

Diet education not indicated; patient on regular diet. 



Nutrition Care Level: mod 



Nutrition Diagnosis: Inadequate oral intake related to chronic illness as evidenced by pt 
requiring ONS and EN through PEG while at nursing home. 



Goal: Patient will meet % of estimated needs by follow up 



Progress: N/A 



Interventions:

Texture-modified diet, Commercial beverage, Composition, Rate, Route,  Recommended 
Modifications, Collaboration with other providers



Monitoring/Evaluation:

Total energy intake, Total protein intake, Formula/Solution, Modified diet, Liquid 
supplement, Weight change





Signed: Isabelle Portillo MS, RD, LD

## 2019-03-13 NOTE — DIAGNOSTIC IMAGING REPORT
EXAMINATION:  CHEST SINGLE (PORTABLE)    



INDICATION: Breathing problems.



COMPARISON:  3/12/2019.

     

FINDINGS:

TUBES and LINES:  None.



LUNGS: Mild bilateral low lung volumes. Bibasilar subsegmental atelectasis.



PLEURA:  No pleural effusion or pneumothorax. 



HEART AND MEDIASTINUM:  The cardiac silhouette is mildly enlarged.. There are

atherosclerotic calcifications within the aorta.



BONES AND SOFT TISSUES:  No acute osseous abnormality.



UPPER ABDOMEN: No free air under the diaphragm. Note is made of a large, 5.2 cm

calcific density projected on the left upper quadrant, unchanged. Etiology is

uncertain, possibly calcified splenic lesion.



IMPRESSION: 

Bibasilar subsegmental atelectasis.



Note is made of a large, 5.2 cm calcific density projected on the left upper

quadrant, unchanged. Etiology is uncertain, possibly calcified splenic lesion.

This could be further evaluated with ultrasound of left upper quadrant of the

clinically warranted.



Signed by: Dr. RISHABH Garcia M.D. on 3/13/2019 6:33 AM

## 2019-03-13 NOTE — NUR
DR AMEZCUA RETURNED PHONE CALL. INFORMED HIM OF PATIENT DX AND TIME OF ORDER WAS PLACED FOR 
CONSULT. WAS INFORMED THAT HE WILL SEE PATIENT IN AM

## 2019-03-13 NOTE — DIAGNOSTIC IMAGING REPORT
CT BRAIN WO



HISTORY: Altered mental status



COMPARISON:  None.



Technique: 

Noncontrast axial scans were obtained from skull base to the vertex.  Coronal

and sagittal reconstructions obtained from the axial data.  One or more of the

following dose reduction techniques were used: Automated exposure control,

adjustment of the mA and/or kV according to patient size, and/or utilization of

iterative reconstruction technique. Beam hardening artifacts obscure some

details.



DISCUSSION:



Scalp/Skull: Unremarkable.

Brain sulci: Prominent.

Ventricles: Compensatory dilatation.

Extra-axial spaces: No masses or fluid collections. Carotid siphon 

calcifications are present.



Parenchyma: 

Severe bilateral deep white matter hypodensity is likely chronic microvascular

ischemic change. 

Multiple old bilateral cerebellar cortical infarcts appear old.

Old bilateral thalamic lacunar infarcts are also present.

Focal areas of bilateral upper perirolandic encephalomalacia, left greater than

right, are likely from remote infarcts.

Otherwise, no masses, hemorrhage, or large vascular territory acute infarct.



Dural sinuses:  No abnormal densities.

Sellar/Suprasellar region: Intact.

Skull base: Intact.

Incidental findings: Moderate left sphenoid sinus mucosal thickening is likely

chronic; there is surrounding sclerosis.



IMPRESSION:

1.  No definite acute intracranial abnormalities.

2.  Severe supratentorial chronic microvascular ischemic change. Generalized

cerebral volume loss.

3.  Multiple old small bilateral cerebellar cortical infarcts appear

old/chronic. Associated old bilateral thalamic lacunar infarcts. Old bilateral

upper perirolandic cortical infarcts, left greater than right. Chronic/remote

embolic ischemia should be considered.





Signed by: Dr. Alton Arreola M.D. on 3/13/2019 3:33 PM

## 2019-03-14 VITALS — SYSTOLIC BLOOD PRESSURE: 154 MMHG | DIASTOLIC BLOOD PRESSURE: 77 MMHG

## 2019-03-14 VITALS — DIASTOLIC BLOOD PRESSURE: 63 MMHG | SYSTOLIC BLOOD PRESSURE: 156 MMHG

## 2019-03-14 VITALS — SYSTOLIC BLOOD PRESSURE: 143 MMHG | DIASTOLIC BLOOD PRESSURE: 74 MMHG

## 2019-03-14 VITALS — SYSTOLIC BLOOD PRESSURE: 147 MMHG | DIASTOLIC BLOOD PRESSURE: 64 MMHG

## 2019-03-14 VITALS — SYSTOLIC BLOOD PRESSURE: 141 MMHG | DIASTOLIC BLOOD PRESSURE: 58 MMHG

## 2019-03-14 VITALS — DIASTOLIC BLOOD PRESSURE: 59 MMHG | SYSTOLIC BLOOD PRESSURE: 126 MMHG

## 2019-03-14 VITALS — SYSTOLIC BLOOD PRESSURE: 144 MMHG | DIASTOLIC BLOOD PRESSURE: 72 MMHG

## 2019-03-14 VITALS — DIASTOLIC BLOOD PRESSURE: 82 MMHG | SYSTOLIC BLOOD PRESSURE: 145 MMHG

## 2019-03-14 VITALS — SYSTOLIC BLOOD PRESSURE: 140 MMHG | DIASTOLIC BLOOD PRESSURE: 63 MMHG

## 2019-03-14 VITALS — DIASTOLIC BLOOD PRESSURE: 55 MMHG | SYSTOLIC BLOOD PRESSURE: 119 MMHG

## 2019-03-14 VITALS — DIASTOLIC BLOOD PRESSURE: 71 MMHG | SYSTOLIC BLOOD PRESSURE: 136 MMHG

## 2019-03-14 VITALS — SYSTOLIC BLOOD PRESSURE: 143 MMHG | DIASTOLIC BLOOD PRESSURE: 66 MMHG

## 2019-03-14 VITALS — DIASTOLIC BLOOD PRESSURE: 51 MMHG | SYSTOLIC BLOOD PRESSURE: 152 MMHG

## 2019-03-14 VITALS — DIASTOLIC BLOOD PRESSURE: 86 MMHG | SYSTOLIC BLOOD PRESSURE: 138 MMHG

## 2019-03-14 LAB
ANION GAP SERPL CALC-SCNC: 11.5 MMOL/L (ref 8–16)
ANISOCYTOSIS BLD QL SMEAR: SLIGHT
BASOPHILS # BLD AUTO: 0 10*3/UL (ref 0–0.1)
BASOPHILS NFR BLD AUTO: 0.2 % (ref 0–1)
BUN SERPL-MCNC: 18 MG/DL (ref 7–26)
BUN/CREAT SERPL: 25 (ref 6–25)
CALCIUM SERPL-MCNC: 7.7 MG/DL (ref 8.4–10.2)
CHLORIDE SERPL-SCNC: 107 MMOL/L (ref 98–107)
CO2 SERPL-SCNC: 23 MMOL/L (ref 22–29)
DEPRECATED NEUTROPHILS # BLD AUTO: 5.3 10*3/UL (ref 2.1–6.9)
EGFRCR SERPLBLD CKD-EPI 2021: > 60 ML/MIN (ref 60–?)
EOSINOPHIL # BLD AUTO: 0 10*3/UL (ref 0–0.4)
EOSINOPHIL NFR BLD AUTO: 0.2 % (ref 0–6)
EOSINOPHIL NFR BLD MANUAL: 1 % (ref 0–7)
ERYTHROCYTE [DISTWIDTH] IN CORD BLOOD: 13.5 % (ref 11.7–14.4)
GLUCOSE SERPLBLD-MCNC: 134 MG/DL (ref 74–118)
HCT VFR BLD AUTO: 30.9 % (ref 34.2–44.1)
HGB BLD-MCNC: 10.2 G/DL (ref 12–16)
LYMPHOCYTES # BLD: 0.3 10*3/UL (ref 1–3.2)
LYMPHOCYTES NFR BLD AUTO: 4.8 % (ref 18–39.1)
LYMPHOCYTES NFR BLD MANUAL: 1 % (ref 19–48)
MCH RBC QN AUTO: 30.7 PG (ref 28–32)
MCHC RBC AUTO-ENTMCNC: 33 G/DL (ref 31–35)
MCV RBC AUTO: 93.1 FL (ref 81–99)
MONOCYTES # BLD AUTO: 0.4 10*3/UL (ref 0.2–0.8)
MONOCYTES NFR BLD AUTO: 5.9 % (ref 4.4–11.3)
MONOCYTES NFR BLD MANUAL: 3 % (ref 3.4–9)
NEUTS BAND NFR BLD MANUAL: 1 %
NEUTS SEG NFR BLD AUTO: 88.4 % (ref 38.7–80)
NEUTS SEG NFR BLD MANUAL: 88 % (ref 40–74)
PLAT MORPH BLD: NORMAL
PLATELET # BLD AUTO: 146 X10E3/UL (ref 140–360)
PLATELET # BLD EST: (no result) 10*3/UL
POTASSIUM SERPL-SCNC: 2.5 MMOL/L (ref 3.5–5.1)
RBC # BLD AUTO: 3.32 X10E6/UL (ref 3.6–5.1)
RBC MORPH BLD: NORMAL
SODIUM SERPL-SCNC: 139 MMOL/L (ref 136–145)

## 2019-03-14 RX ADMIN — FUROSEMIDE SCH MG: 20 TABLET ORAL at 09:00

## 2019-03-14 RX ADMIN — LEVALBUTEROL HYDROCHLORIDE SCH MG: 0.63 SOLUTION RESPIRATORY (INHALATION) at 01:30

## 2019-03-14 RX ADMIN — CLINDAMYCIN PHOSPHATE SCH MLS/HR: 18 INJECTION, SOLUTION INTRAVENOUS at 06:00

## 2019-03-14 RX ADMIN — METOPROLOL TARTRATE SCH MG: 25 TABLET, FILM COATED ORAL at 17:00

## 2019-03-14 RX ADMIN — NORTRIPTYLINE HYDROCHLORIDE SCH MG: 25 CAPSULE ORAL at 21:55

## 2019-03-14 RX ADMIN — LACTULOSE SCH GM: 20 SOLUTION ORAL at 09:00

## 2019-03-14 RX ADMIN — Medication SCH MG: at 09:00

## 2019-03-14 RX ADMIN — METOPROLOL TARTRATE SCH MG: 25 TABLET, FILM COATED ORAL at 09:00

## 2019-03-14 RX ADMIN — CEFTRIAXONE SCH MLS/HR: 100 INJECTION, POWDER, FOR SOLUTION INTRAVENOUS at 20:55

## 2019-03-14 RX ADMIN — LEVOTHYROXINE SODIUM SCH MCG: 88 TABLET ORAL at 07:55

## 2019-03-14 RX ADMIN — LEVALBUTEROL HYDROCHLORIDE SCH MG: 0.63 SOLUTION RESPIRATORY (INHALATION) at 07:10

## 2019-03-14 RX ADMIN — RIVAROXABAN SCH MG: 15 TABLET, FILM COATED ORAL at 21:55

## 2019-03-14 RX ADMIN — LEVALBUTEROL HYDROCHLORIDE SCH MG: 0.63 SOLUTION RESPIRATORY (INHALATION) at 13:25

## 2019-03-14 NOTE — NUR
PATIENT HAD SMALL BM AND MANISHA CARE DONE AND NEW URETHRAL WICK APPLIED ALSO. PADS AND LINENS 
AND GOWN CHANGED AND TOLERATED WELL. NEW ALLEVYN APPLIED TO SACRUM. REDDENED SKIN AREAS 
NOTED TO LOWER TO MID SECTION OF LEFT SIDE OF HER BACK. WOUND CARE WILL BE CONSULTED.

## 2019-03-14 NOTE — NUR
Dr. Tee called and left message on his voice mail regarding Magnesium level of 1.2 and 
awaiting call back

## 2019-03-14 NOTE — PROGRESS NOTE
DATE:  03/14/2019

 

Medicine Progress Note 

 

SUBJECTIVE:  The patient is much more alert today on examination.  Blood pressure is

much more stable now.  White count is improved.  Lactic acid is back to normal.  No

overnight events. 

 

PHYSICAL EXAMINATION:

VITAL SIGNS:  Temperature is 99.7, pulse is 103, respiratory rate is 20, blood pressure

is 152/51, pulse ox 98% on 2 L nasal cannula. 

GENERAL:  Not in acute distress.  Alert and oriented x2.  Cooperative on examination. 

HEENT:  Head is normocephalic, atraumatic.  Eyes, pupils are equal, round, and reactive

to light bilaterally.  Extraocular movements are intact bilaterally.  Throat, no

evidence of erythema or exudate in the posterior pharynx.  Has poor dentition. 

NECK:  Supple.  Good range of motion. 

PULMONARY:  Clear to auscultation bilaterally.  No wheezing.  No rales.  No rhonchi.  No

crackles appreciated. 

CARDIOVASCULAR:  Positive S1, S2.  No murmur, rub, or gallop appreciated. 

ABDOMEN:  Soft, nondistended, nontender to palpation.  Bowel sounds present. 

MUSCULOSKELETAL:  Strength is 5/5 throughout.  No evidence of any muscle deficits on

examination.  No weakness appreciated. 

NEUROLOGICAL:  Cranial nerves II through XII grossly intact.  No evidence of any

neurological deficits on exam. 

SKIN:  Intact.  Warm to touch.  Good cap refill. 

PSYCHIATRIC:  Normal affect and mood. 

EXTREMITIES:  No edema.  Good range of motion throughout.

LABORATORY DATA:  Lab findings show white count of 5.9, hemoglobin 10.0, hematocrit is

31, platelets of 146.  Chemistry; sodium is 139, potassium 3.5, chloride 107, bicarb 23,

anion gap of 11, BUN is 18, creatinine is 0.71, glucose is 134, lactic acid is 8.5 and

normal, calcium is 7.7.  Urinalysis found to be consistent with UTI.  Blood cultures

were negative.  Sputum cultures consistent with Pseudomonas aeruginosa, which is also

ESBL.  Urine culture is consistent with ESBL UTI. 

 

IMPRESSION:  

1. Sepsis secondary to urinary tract infection with aspiration pneumonia.

2. Urinary tract infection __________ to extended-spectrum beta-lactamase.

3. Aspiration pneumonitis.

4. Moderate protein-calorie malnutrition with PEG tube.

5. Baseline dementia.

6. History of atrial fibrillation, on anticoagulation and rate control medications.

7. History of cerebrovascular accident in the past.

 

PLAN:  At this time, urine culture is positive for ESBL E coli.  We did discuss this

with ID that the patient is currently on Rocephin and was just discontinued on

clindamycin as well this morning.  It is felt that the sputum culture __________ does

not need clindamycin, but I would like to talk to him about the Rocephin that was

continued, though now she has ESBL UTI.  Continue with PEG tube feeds.  Replace

potassium, which I have discussed with the nurse staff as well.  She has failed her

swallow eval tremendously and she is going to get a modified barium swallow.  She has a

much slower rate today on exam.  She is still DNR/DNI as per family's 

wishes.  Otherwise, we will get a.m. labs.  Start normal saline, put on free water with

the tube feeds.  Once patient passes her swallow eval, I will resume feeds when she

returns today. 

 

 

 

 

______________________________

MD NURA House/JESUS

D:  03/14/2019 12:04:11

T:  03/14/2019 14:59:32

Job #:  [id number]

## 2019-03-14 NOTE — CONSULTATION
DATE OF CONSULTATION:  03/14/2019  

 

Located at [facility] in [place], [place]. 

 

This is a patient of Dr. Tee. 

 

I want to thank you for this kind consult.

 

HISTORY OF PRESENT ILLNESS:  Ms. Jarquin is an 83-year-old lady with a history of

dementia and CVA three years ago.  She is a nursing home transfer and has a PEG tube.

The patient was noticed by the son getting more confused and shortness of breath.

Radiology showed "fluids in the lungs" and there was a concern about the possibility of

a UTI and pneumonia.  The patient is gradually failing to thrive with decreased

appetite, but as I mentioned above, patient has a PEG tube and per my discussion, the

patient is on feeding at night and encouraging oral intake during the day.  So the

patient was transferred to [facility] for evaluation of her weakness and

failure to thrive.  Blood culture was negative, 24 hours, urine culture, ESBL, E. Coli

x2.  Sputum shows Pseudomonas aeruginosa, E. coli, ESBL, and also gram-negative bacilli.

 CT of the head showed no acute finding.  Chest x-ray was done and it showed low lung

volumes with patchy bibasilar opacities, which may reflect atelectasis or early

pneumonia in appropriate clinical settings, so Infectious Disease was consulted for

sepsis.  The patient's lactic acid is 8.5, which is coming from 46 to 48, 53 to 27 to 22

and now 8.5 today.  White blood cell is 5.98 coming from 16.23, hemoglobin is 10.2,

platelet count of 146, which is dropping from 226, creatinine is 0.71, potassium is 2.5

with a sodium of 139. 

 

PAST MEDICAL HISTORY:  CVA, hypothyroidism, dysphagia, failure to thrive, debility,

dementia, Alzheimer-type, hypotensive events at times, UTI. 

 

SOCIAL HISTORY:  The patient is a nursing home resident with no recent history of a

tobacco or ethanol. 

 

LABORATORY DATA:  Labs as mentioned above.

 

REVIEW OF SYSTEMS:

The patient is nonverbal to me.  Speech is in the room currently and trying to do

swallow test per my discussion with the Speech.  The patient coughs with all kinds of

consistency so far which are the liquids and applesauce. 

 

PHYSICAL EXAMINATION:

GENERAL:  No acute distress, nonverbal, awake, not following commands.  Responds few

words, maybe yes. 

VITAL SIGNS:  Temperature is 99.8 max since admission on March 12th, pulse is 103,

respiration 20, blood pressure is 152/51. 

CARDIOVASCULAR:  S1 and S2. 

CHEST:  Equal expansion.  Decreased breath sounds.  No acute distress. 

ABDOMEN:  Soft, nontender.  No distention.  Bowel sounds positive in four quadrants with

PEG tube. 

HEENT:  Moist.  No pallor.  No JVD. 

EXTREMITIES:  Some edema, some contraction of the hands.  No acute finding.

ASSESSMENT AND PLAN:  This is a pleasant 83-year-old  female, nursing home

resident, not much of verbal with dysphagia with PEG tube.  Concern about pneumonia.

Concern about urinary tract infection.  The patient is responding well to Rocephin and

Cleocin.  We will go ahead and stop the Cleocin, continue with Rocephin.  Platelets are

dropping.  We will monitor platelets.  Temperature has not been over 99.8 since

admission.  We will monitor the patient throughout hospitalization.  The patient has had

a swallow test, so there is a possibility patient to spike fever tomorrow secondary to

aspiration pneumonia.  We will follow up with the patient clinically.  Further

management of this patient is based on the final laboratory and physical examination.

This case was discussed with Dr. Bryant in detail, and I want to thank you for this kind

consult. 

 

 

Dictated by Juan Brooke PA-C (Al)

 

______________________________

Miky Bryant MD

 

AS/MODL

D:  03/14/2019 09:58:01

T:  03/14/2019 13:23:44

Job #:  [id number]

## 2019-03-14 NOTE — NUR
Dr. Tee called again and left message of Magnesium level of 1.2 and that patient will be 
moving to Room 204 with telemetry and call back number for MS-2 given and to ask for Nurse 
DARIN Levy. Son at bedside (Yasmany). Telemetry # [id number] applied to patient and reads [id number]. Will 
be moving patient in her own bed.

## 2019-03-15 VITALS — SYSTOLIC BLOOD PRESSURE: 132 MMHG | DIASTOLIC BLOOD PRESSURE: 71 MMHG

## 2019-03-15 VITALS — DIASTOLIC BLOOD PRESSURE: 73 MMHG | SYSTOLIC BLOOD PRESSURE: 167 MMHG

## 2019-03-15 VITALS — DIASTOLIC BLOOD PRESSURE: 63 MMHG | SYSTOLIC BLOOD PRESSURE: 134 MMHG

## 2019-03-15 VITALS — SYSTOLIC BLOOD PRESSURE: 134 MMHG | DIASTOLIC BLOOD PRESSURE: 63 MMHG

## 2019-03-15 VITALS — DIASTOLIC BLOOD PRESSURE: 65 MMHG | SYSTOLIC BLOOD PRESSURE: 150 MMHG

## 2019-03-15 VITALS — SYSTOLIC BLOOD PRESSURE: 136 MMHG | DIASTOLIC BLOOD PRESSURE: 70 MMHG

## 2019-03-15 VITALS — DIASTOLIC BLOOD PRESSURE: 68 MMHG | SYSTOLIC BLOOD PRESSURE: 156 MMHG

## 2019-03-15 LAB
ANION GAP SERPL CALC-SCNC: 12.1 MMOL/L (ref 8–16)
BASOPHILS # BLD AUTO: 0 10*3/UL (ref 0–0.1)
BASOPHILS NFR BLD AUTO: 0.3 % (ref 0–1)
BUN SERPL-MCNC: 16 MG/DL (ref 7–26)
BUN/CREAT SERPL: 22 (ref 6–25)
CALCIUM SERPL-MCNC: 8.1 MG/DL (ref 8.4–10.2)
CHLORIDE SERPL-SCNC: 105 MMOL/L (ref 98–107)
CO2 SERPL-SCNC: 25 MMOL/L (ref 22–29)
DEPRECATED NEUTROPHILS # BLD AUTO: 5.5 10*3/UL (ref 2.1–6.9)
EGFRCR SERPLBLD CKD-EPI 2021: > 60 ML/MIN (ref 60–?)
EOSINOPHIL # BLD AUTO: 0.1 10*3/UL (ref 0–0.4)
EOSINOPHIL NFR BLD AUTO: 0.9 % (ref 0–6)
ERYTHROCYTE [DISTWIDTH] IN CORD BLOOD: 13.6 % (ref 11.7–14.4)
GLUCOSE SERPLBLD-MCNC: 132 MG/DL (ref 74–118)
HCT VFR BLD AUTO: 36.6 % (ref 34.2–44.1)
HGB BLD-MCNC: 11.5 G/DL (ref 12–16)
LYMPHOCYTES # BLD: 0.9 10*3/UL (ref 1–3.2)
LYMPHOCYTES NFR BLD AUTO: 12.5 % (ref 18–39.1)
MCH RBC QN AUTO: 30 PG (ref 28–32)
MCHC RBC AUTO-ENTMCNC: 31.4 G/DL (ref 31–35)
MCV RBC AUTO: 95.6 FL (ref 81–99)
MONOCYTES # BLD AUTO: 0.5 10*3/UL (ref 0.2–0.8)
MONOCYTES NFR BLD AUTO: 7.7 % (ref 4.4–11.3)
NEUTS SEG NFR BLD AUTO: 78.3 % (ref 38.7–80)
PLATELET # BLD AUTO: 149 X10E3/UL (ref 140–360)
POTASSIUM SERPL-SCNC: 4.1 MMOL/L (ref 3.5–5.1)
RBC # BLD AUTO: 3.83 X10E6/UL (ref 3.6–5.1)
SODIUM SERPL-SCNC: 138 MMOL/L (ref 136–145)

## 2019-03-15 RX ADMIN — NORTRIPTYLINE HYDROCHLORIDE SCH MG: 25 CAPSULE ORAL at 20:29

## 2019-03-15 RX ADMIN — METOPROLOL TARTRATE SCH MG: 25 TABLET, FILM COATED ORAL at 17:02

## 2019-03-15 RX ADMIN — CEFTRIAXONE SCH MLS/HR: 100 INJECTION, POWDER, FOR SOLUTION INTRAVENOUS at 20:29

## 2019-03-15 RX ADMIN — FUROSEMIDE SCH MG: 20 TABLET ORAL at 09:13

## 2019-03-15 RX ADMIN — LEVALBUTEROL HYDROCHLORIDE SCH MG: 0.63 SOLUTION RESPIRATORY (INHALATION) at 13:47

## 2019-03-15 RX ADMIN — LEVALBUTEROL HYDROCHLORIDE SCH MG: 0.63 SOLUTION RESPIRATORY (INHALATION) at 19:22

## 2019-03-15 RX ADMIN — Medication SCH MG: at 09:13

## 2019-03-15 RX ADMIN — LEVOTHYROXINE SODIUM SCH MCG: 88 TABLET ORAL at 05:31

## 2019-03-15 RX ADMIN — METOPROLOL TARTRATE SCH MG: 25 TABLET, FILM COATED ORAL at 09:13

## 2019-03-15 RX ADMIN — LACTULOSE SCH GM: 20 SOLUTION ORAL at 09:13

## 2019-03-15 RX ADMIN — LEVALBUTEROL HYDROCHLORIDE SCH MG: 0.63 SOLUTION RESPIRATORY (INHALATION) at 07:16

## 2019-03-15 RX ADMIN — RIVAROXABAN SCH MG: 15 TABLET, FILM COATED ORAL at 20:29

## 2019-03-15 RX ADMIN — LEVALBUTEROL HYDROCHLORIDE SCH MG: 0.63 SOLUTION RESPIRATORY (INHALATION) at 00:45

## 2019-03-15 NOTE — NUR
Dr. Tee called because he got a call that the patients magnesium was low so he ordered 2G 
IV Magnesium Sulfate.

## 2019-03-15 NOTE — NUR
Report taken from previous nurse. Patient in bed, lying on her left side. son at bedside. 
Call light within reach.

## 2019-03-15 NOTE — NUR
WOUND CARE CONSULTATION: INITIAL EVALUATION



Patient admitted from NH to ER for Increased Confusion, Decreased Appetite. 

DX: ASP PNA, Hemorrhagic cystitis, UTI.

HX: HTN, DM. 



LABS:

WBC6.97

HGB11.5

HCT36.6

CKR843



Wound Care Consulted for Redness to Lower Mid Section of Back. 

PATIENT VISIT:

Patient in bed, confused.

Maxime Score 9

Strict PUP Active

Alternating Pressure Air Mattress in place

Bilateral Heel Protectors in place

Incontinent, Diapered. 

Presents with Mid Back @ spine abrasion- Reddened with bruising. Linear Shaped. Skin not 
open. 

Right Lateral Malleolus with scab 0.5x0.5cm superficial, stable and healing, open to air. 
Non-pressure related. edges irregular. 



IMPRESSION:

1. Mid Lower Back at Spine - Abrasion - 

 - Venelex Ointment and Cover with Allevyn Foam Dressing Daily. 



RECOMMENDATION:

1. Mid Lower Back at Spine - Abrasion - 

 - Venelex Ointment and Cover with Allevyn Foam Dressing Daily. 

2. Continue Strict PUP

3. Continue Turning and Repositioning Every 2h

4. Continue Bilateral Heel Protectors

5. Continue Alternating Pressure Air Mattress.



Thank you for consulting Wound Care. 

-------------------------------------------------------------------------------

Addendum: 03/15/19 at 1445 by Karthik Cedeno RN

-------------------------------------------------------------------------------

Amended: Links added.

## 2019-03-15 NOTE — NUR
CM called and spoke to pt's son Yasmany Jarquin [phone]. Informed him of his mother's 
Medicare Rights. He verbalized understanding. Signed copy placed in chart. Informed Mr. Jarquin that copy will be left at bedside for him.

## 2019-03-15 NOTE — NUR
Pt received resting in bed. Alert and oriented to person alone with saline lock #20 in right 
hand. Pt is on contact isolation for ESBL in urine. Oriented to staff and surroundings, 
encouraged to press call bell if help needed. All meds given as ordered. Call bell within 
reach. Emotional support given. Fall precautions maintained. Will monitor

## 2019-03-15 NOTE — PROGRESS NOTE
DATE:  03/15/2019

 

Medicine Progress Note 

 

SUBJECTIVE:  The patient is at baseline with no complaints.  She is arousable when you

wake her up, she is able to talk with me.  No overnight events.  She is improving, very

dry, failed her swallow eval. 

 

OBJECTIVE:  VITAL SIGNS:  Temperature 97.9, pulse 97, respiratory rate is 20, blood

pressure is 134/63, pulse ox is 97% on 2 L nasal cannula. 

GENERAL:  Not in acute distress.  Alert and oriented x3.  Cooperative on examination. 

HEENT:  Head is normocephalic and atraumatic.  Eyes; pupils are equal, round, and

reactive to light bilaterally.  Extraocular movements are intact bilaterally.  Throat,

no evidence of erythema or exudates in the posterior pharynx.  Has poor dentition. 

NECK:  Supple.  Good range of motion. 

PULMONARY:  Clear to auscultation bilaterally.  No wheezing, no rales, no rhonchi, no

crackles appreciated. 

CARDIOVASCULAR:  Positive S1, S2.  No murmurs, rubs, or gallops appreciated. 

ABDOMEN:  Soft, nondistended, and nontender to palpation.  Bowel sounds present. 

MUSCULOSKELETAL:  At baseline. 

NEUROLOGICAL:  At baseline. 

SKIN:  Intact.  Warm to touch.  Good cap refill. 

PSYCHIATRIC:  At baseline. 

EXTREMITIES:  No edema.  Good range of motion throughout

 

LABORATORY DATA:  Lab findings show white count 6.9, hemoglobin 9.5, hematocrit is 36.6,

and platelets of 149.  Chemistry; sodium 138, potassium 4.1, chloride 105, bicarb 25,

anion gap is 12, BUN is 16, creatinine is 0.73, glucose is 132, calcium 2.1.

Microbiology, blood cultures were negative.  Urine culture and sputum cultures were

noted.  A modified barium swallow was performed yesterday, 03/14/2019, shows penetration

at the laryngeal area and concern for aspiration. 

 

IMPRESSION:  

1. Sepsis secondary to urinary tract infection with aspiration pneumonia.

2. Urinary tract infection with extended-spectrum beta-lactamase.

3. Aspiration pneumonitis.

4. Moderate protein-calorie malnutrition with PEG tube, failed swallow eval and modified

barium swallow. 

5. Baseline dementia.

6. History of atrial fibrillation, on anticoagulation and rate control medications.

7. History of cerebrovascular accident in the past, at baseline.

 

PLAN:  At this time, blood and urine cultures were noted.  IV antibiotics as per ID

recommendations.  Sputum culture is also noted.  Continue with PEG tube feeds.  She

failed modified barium swallow.  Work with PT and OT.  She is on DVT prophylaxis.  Get

a.m. labs. 

Current labs are stable.  Plan is to monitor through the weekend and likely discharge on

Monday back to the nursing home. 

 

 

 

 

______________________________

MD NURA House/JESUS

D:  03/15/2019 11:49:49

T:  03/15/2019 14:33:40

Job #:  [id number]

## 2019-03-16 VITALS — SYSTOLIC BLOOD PRESSURE: 124 MMHG | DIASTOLIC BLOOD PRESSURE: 57 MMHG

## 2019-03-16 VITALS — SYSTOLIC BLOOD PRESSURE: 153 MMHG | DIASTOLIC BLOOD PRESSURE: 69 MMHG

## 2019-03-16 VITALS — DIASTOLIC BLOOD PRESSURE: 66 MMHG | SYSTOLIC BLOOD PRESSURE: 142 MMHG

## 2019-03-16 VITALS — SYSTOLIC BLOOD PRESSURE: 138 MMHG | DIASTOLIC BLOOD PRESSURE: 65 MMHG

## 2019-03-16 VITALS — DIASTOLIC BLOOD PRESSURE: 57 MMHG | SYSTOLIC BLOOD PRESSURE: 113 MMHG

## 2019-03-16 VITALS — DIASTOLIC BLOOD PRESSURE: 68 MMHG | SYSTOLIC BLOOD PRESSURE: 156 MMHG

## 2019-03-16 VITALS — SYSTOLIC BLOOD PRESSURE: 161 MMHG | DIASTOLIC BLOOD PRESSURE: 70 MMHG

## 2019-03-16 VITALS — SYSTOLIC BLOOD PRESSURE: 113 MMHG | DIASTOLIC BLOOD PRESSURE: 57 MMHG

## 2019-03-16 LAB
ANION GAP SERPL CALC-SCNC: 14.3 MMOL/L (ref 8–16)
BASOPHILS # BLD AUTO: 0 10*3/UL (ref 0–0.1)
BASOPHILS NFR BLD AUTO: 0.2 % (ref 0–1)
BUN SERPL-MCNC: 15 MG/DL (ref 7–26)
BUN/CREAT SERPL: 21 (ref 6–25)
CALCIUM SERPL-MCNC: 8.2 MG/DL (ref 8.4–10.2)
CHLORIDE SERPL-SCNC: 97 MMOL/L (ref 98–107)
CO2 SERPL-SCNC: 29 MMOL/L (ref 22–29)
DEPRECATED NEUTROPHILS # BLD AUTO: 5.4 10*3/UL (ref 2.1–6.9)
EGFRCR SERPLBLD CKD-EPI 2021: > 60 ML/MIN (ref 60–?)
EOSINOPHIL # BLD AUTO: 0 10*3/UL (ref 0–0.4)
EOSINOPHIL NFR BLD AUTO: 0.6 % (ref 0–6)
ERYTHROCYTE [DISTWIDTH] IN CORD BLOOD: 13.5 % (ref 11.7–14.4)
GLUCOSE SERPLBLD-MCNC: 176 MG/DL (ref 74–118)
HCT VFR BLD AUTO: 36 % (ref 34.2–44.1)
HGB BLD-MCNC: 11.4 G/DL (ref 12–16)
LYMPHOCYTES # BLD: 0.7 10*3/UL (ref 1–3.2)
LYMPHOCYTES NFR BLD AUTO: 10.5 % (ref 18–39.1)
MCH RBC QN AUTO: 30 PG (ref 28–32)
MCHC RBC AUTO-ENTMCNC: 31.7 G/DL (ref 31–35)
MCV RBC AUTO: 94.7 FL (ref 81–99)
MONOCYTES # BLD AUTO: 0.4 10*3/UL (ref 0.2–0.8)
MONOCYTES NFR BLD AUTO: 6.1 % (ref 4.4–11.3)
NEUTS SEG NFR BLD AUTO: 82.1 % (ref 38.7–80)
PLATELET # BLD AUTO: 150 X10E3/UL (ref 140–360)
POTASSIUM SERPL-SCNC: 3.3 MMOL/L (ref 3.5–5.1)
RBC # BLD AUTO: 3.8 X10E6/UL (ref 3.6–5.1)
SODIUM SERPL-SCNC: 137 MMOL/L (ref 136–145)

## 2019-03-16 RX ADMIN — LACTULOSE SCH GM: 20 SOLUTION ORAL at 09:00

## 2019-03-16 RX ADMIN — NORTRIPTYLINE HYDROCHLORIDE SCH MG: 25 CAPSULE ORAL at 21:17

## 2019-03-16 RX ADMIN — CASTOR OIL AND BALSAM, PERU SCH GM: 788; 87 OINTMENT TOPICAL at 09:00

## 2019-03-16 RX ADMIN — FUROSEMIDE SCH MG: 20 TABLET ORAL at 09:01

## 2019-03-16 RX ADMIN — LEVALBUTEROL HYDROCHLORIDE SCH MG: 0.63 SOLUTION RESPIRATORY (INHALATION) at 19:42

## 2019-03-16 RX ADMIN — RIVAROXABAN SCH MG: 15 TABLET, FILM COATED ORAL at 21:17

## 2019-03-16 RX ADMIN — Medication SCH ML: at 09:01

## 2019-03-16 RX ADMIN — LEVOTHYROXINE SODIUM SCH MCG: 88 TABLET ORAL at 06:26

## 2019-03-16 RX ADMIN — METOPROLOL TARTRATE SCH MG: 25 TABLET, FILM COATED ORAL at 17:05

## 2019-03-16 RX ADMIN — ACETAMINOPHEN PRN MG: 325 SOLUTION ORAL at 17:05

## 2019-03-16 RX ADMIN — MEROPENEM SCH MLS/HR: 500 INJECTION INTRAVENOUS at 22:32

## 2019-03-16 RX ADMIN — METOPROLOL TARTRATE SCH MG: 25 TABLET, FILM COATED ORAL at 09:01

## 2019-03-16 RX ADMIN — LEVALBUTEROL HYDROCHLORIDE SCH MG: 0.63 SOLUTION RESPIRATORY (INHALATION) at 06:25

## 2019-03-16 RX ADMIN — Medication SCH MG: at 09:01

## 2019-03-16 RX ADMIN — LEVALBUTEROL HYDROCHLORIDE SCH MG: 0.63 SOLUTION RESPIRATORY (INHALATION) at 01:25

## 2019-03-16 RX ADMIN — LEVALBUTEROL HYDROCHLORIDE SCH MG: 0.63 SOLUTION RESPIRATORY (INHALATION) at 13:14

## 2019-03-16 NOTE — DIAGNOSTIC IMAGING REPORT
EXAMINATION:  CHEST SINGLE (PORTABLE)    



INDICATION:      

^elevated temp, r/o aspiration

^[id number]

^1725

^Y



COMPARISON:  3/13/2019

     

FINDINGS:  AP view   



TUBES and LINES:  None.



LUNGS:  Lungs are well inflated.  Mild right basilar linear opacification.     





PLEURA:  No pleural effusion or pneumothorax.



HEART AND MEDIASTINUM:  The cardiomediastinal silhouette is unremarkable. Aorta

is calcified and tortuous.



BONES AND SOFT TISSUES:  No acute osseous lesion.  Soft tissues are

unremarkable.



UPPER ABDOMEN: No free air under the diaphragm. Again seen left upper quadrant

calcified mass.



IMPRESSION: 

Mild right basilar linear opacification, likely atelectasis. Developing

pneumonia cannot be entirely excluded.





Signed by: Dr. Harvinder Johnson MD on 3/16/2019 6:05 PM

## 2019-03-16 NOTE — PROGRESS NOTE
DATE:  03/16/2019

 

Medicine Progress Note 

 

SUBJECTIVE:  The patient is at baseline.  No changes.  She is doing much better though.

She is not alert enough to even begin feeds, which we are going to re-initiate hopefully

on Monday.  Discussed case with nursing staff. 

 

OBJECTIVE:  VITAL SIGNS:  Temperature 97.5, pulse 77, respiratory rate 19, blood

pressure 142/66, and pulse ox 100% on 2 L nasal cannula. 

GENERAL:  Not in acute distress.  Alert and oriented x3.  Cooperative on examination. 

HEENT:  Head is normocephalic and atraumatic.  Eyes; pupils are equal, round, and

reactive to light bilaterally.  Extraocular movements are intact bilaterally.  Throat,

no evidence of erythema or exudates in the posterior pharynx.  Has poor dentition. 

NECK:  Supple.  Good range of motion. 

PULMONARY:  Clear to auscultation bilaterally.  No wheezing, no rales, no rhonchi, no

crackles appreciated. 

CARDIOVASCULAR:  Positive S1, S2.  No murmurs, rubs, or gallops appreciated. 

ABDOMEN:  Soft, nondistended, and nontender to palpation.  Bowel sounds present. 

MUSCULOSKELETAL:  Strength is 5/5 throughout.  No evidence of any muscle deficits on

examination.  No weakness appreciated. 

NEUROLOGICAL:  Unable to assess. 

SKIN:  Intact.  Warm to touch.  Good cap refill. 

PSYCHIATRIC:  Normal affect and mood. 

EXTREMITIES:  No edema.  Good range of motion throughout.

 

LAB FINDINGS:  Show white count 6.6, hemoglobin 12.1, Hematocrit 36, and platelets of

150.  Chemistry; sodium 137, potassium 3.3, chloride 97, bicarb 29, anion gap of 14, BUN

is 15, creatinine is 0.71, and calcium is 8.2.  Urinalysis noted.  Blood cultures were

no growth to date.  Urine culture and sputum cultures are noted. 

 

IMAGING STUDIES:  None.

 

IMPRESSION:  

1. Sepsis secondary to urinary tract infection with underlying aspiration pneumonia.

2. Urinary tract infection with ESBL.

3. Aspiration pneumonitis.

4. Moderate protein-calorie malnutrition with PEG tube failed, swallow eval, modified

barium swallow. 

5. Baseline dementia.

6. History of atrial fibrillation, on anticoagulation and rate control medication.

7. History of cerebrovascular accident in the past.

 

PLAN:  At this time, blood and urine cultures were noted and IV antibiotics as per ID

recommendations.  The PEG tube feeds, we will continue with same feeds.  Replace

potassium __________ is very low today.  Get labs for tomorrow.  PT and OT work with her

daily.  She is still very weak to initiate any kind of swallow eval, which were likely

due on Monday.  She is on DVT prophylaxis.  Plan is to discharge home soon in the next 1

to 2 days once we were able to 

have her swallow, if not, she will go back to the nursing home on PEG tube feeds with

outpatient followup __________ swallow eval. 

 

 

 

 

______________________________

MD NURA House/MODL

D:  03/16/2019 13:11:50

T:  03/16/2019 15:23:12

Job #:  [id number]

## 2019-03-16 NOTE — NUR
Gave report to oncoming nurse. Patient in bed with no pain or distress. son at bedside. call 
light within reach.

## 2019-03-16 NOTE — NUR
Dr. Bryant called and said it is okay to Jamie. I told him the note the pharmacist left and 
he said still give the medication. I called pharmacy and told them what Dr Bryant said so 
gave the medication

## 2019-03-16 NOTE — NUR
Pt received resting in bed. Alert and oriented to person alone. Pt turned, and repositioned 
Q2hrs. Emotional support given. All meds given as ordered. Call bell within reach. Will 
monitor

## 2019-03-17 VITALS — DIASTOLIC BLOOD PRESSURE: 68 MMHG | SYSTOLIC BLOOD PRESSURE: 148 MMHG

## 2019-03-17 VITALS — DIASTOLIC BLOOD PRESSURE: 82 MMHG | SYSTOLIC BLOOD PRESSURE: 187 MMHG

## 2019-03-17 VITALS — DIASTOLIC BLOOD PRESSURE: 81 MMHG | SYSTOLIC BLOOD PRESSURE: 135 MMHG

## 2019-03-17 VITALS — SYSTOLIC BLOOD PRESSURE: 138 MMHG | DIASTOLIC BLOOD PRESSURE: 76 MMHG

## 2019-03-17 VITALS — DIASTOLIC BLOOD PRESSURE: 77 MMHG | SYSTOLIC BLOOD PRESSURE: 169 MMHG

## 2019-03-17 VITALS — DIASTOLIC BLOOD PRESSURE: 52 MMHG | SYSTOLIC BLOOD PRESSURE: 108 MMHG

## 2019-03-17 VITALS — SYSTOLIC BLOOD PRESSURE: 135 MMHG | DIASTOLIC BLOOD PRESSURE: 81 MMHG

## 2019-03-17 LAB
ANION GAP SERPL CALC-SCNC: 16.1 MMOL/L (ref 8–16)
BASOPHILS # BLD AUTO: 0 10*3/UL (ref 0–0.1)
BASOPHILS NFR BLD AUTO: 0.2 % (ref 0–1)
BUN SERPL-MCNC: 17 MG/DL (ref 7–26)
BUN/CREAT SERPL: 25 (ref 6–25)
CALCIUM SERPL-MCNC: 8.3 MG/DL (ref 8.4–10.2)
CHLORIDE SERPL-SCNC: 99 MMOL/L (ref 98–107)
CO2 SERPL-SCNC: 27 MMOL/L (ref 22–29)
DEPRECATED NEUTROPHILS # BLD AUTO: 3.8 10*3/UL (ref 2.1–6.9)
EGFRCR SERPLBLD CKD-EPI 2021: > 60 ML/MIN (ref 60–?)
EOSINOPHIL # BLD AUTO: 0.1 10*3/UL (ref 0–0.4)
EOSINOPHIL NFR BLD AUTO: 2 % (ref 0–6)
ERYTHROCYTE [DISTWIDTH] IN CORD BLOOD: 13.6 % (ref 11.7–14.4)
GLUCOSE SERPLBLD-MCNC: 128 MG/DL (ref 74–118)
HCT VFR BLD AUTO: 37.2 % (ref 34.2–44.1)
HGB BLD-MCNC: 11.4 G/DL (ref 12–16)
LYMPHOCYTES # BLD: 0.8 10*3/UL (ref 1–3.2)
LYMPHOCYTES NFR BLD AUTO: 16.5 % (ref 18–39.1)
MAGNESIUM SERPL-MCNC: 1.7 MG/DL (ref 1.3–2.1)
MCH RBC QN AUTO: 29.8 PG (ref 28–32)
MCHC RBC AUTO-ENTMCNC: 30.6 G/DL (ref 31–35)
MCV RBC AUTO: 97.4 FL (ref 81–99)
MONOCYTES # BLD AUTO: 0.4 10*3/UL (ref 0.2–0.8)
MONOCYTES NFR BLD AUTO: 6.9 % (ref 4.4–11.3)
NEUTS SEG NFR BLD AUTO: 74 % (ref 38.7–80)
PLATELET # BLD AUTO: 135 X10E3/UL (ref 140–360)
POTASSIUM SERPL-SCNC: 4.1 MMOL/L (ref 3.5–5.1)
RBC # BLD AUTO: 3.82 X10E6/UL (ref 3.6–5.1)
SODIUM SERPL-SCNC: 138 MMOL/L (ref 136–145)

## 2019-03-17 RX ADMIN — CASTOR OIL AND BALSAM, PERU SCH GM: 788; 87 OINTMENT TOPICAL at 09:26

## 2019-03-17 RX ADMIN — MEROPENEM SCH MLS/HR: 500 INJECTION INTRAVENOUS at 02:51

## 2019-03-17 RX ADMIN — METOPROLOL TARTRATE SCH MG: 25 TABLET, FILM COATED ORAL at 09:26

## 2019-03-17 RX ADMIN — NORTRIPTYLINE HYDROCHLORIDE SCH MG: 25 CAPSULE ORAL at 21:00

## 2019-03-17 RX ADMIN — LEVALBUTEROL HYDROCHLORIDE SCH MG: 0.63 SOLUTION RESPIRATORY (INHALATION) at 06:55

## 2019-03-17 RX ADMIN — FUROSEMIDE SCH MG: 20 TABLET ORAL at 09:26

## 2019-03-17 RX ADMIN — RIVAROXABAN SCH MG: 15 TABLET, FILM COATED ORAL at 21:01

## 2019-03-17 RX ADMIN — METOPROLOL TARTRATE SCH MG: 25 TABLET, FILM COATED ORAL at 17:46

## 2019-03-17 RX ADMIN — LACTULOSE SCH GM: 20 SOLUTION ORAL at 09:26

## 2019-03-17 RX ADMIN — Medication SCH ML: at 09:26

## 2019-03-17 RX ADMIN — MEROPENEM SCH MLS/HR: 500 INJECTION INTRAVENOUS at 09:26

## 2019-03-17 RX ADMIN — LEVOTHYROXINE SODIUM SCH MCG: 88 TABLET ORAL at 05:56

## 2019-03-17 RX ADMIN — MEROPENEM SCH MLS/HR: 500 INJECTION INTRAVENOUS at 17:46

## 2019-03-17 RX ADMIN — Medication SCH MG: at 09:26

## 2019-03-17 RX ADMIN — LEVALBUTEROL HYDROCHLORIDE SCH MG: 0.63 SOLUTION RESPIRATORY (INHALATION) at 19:33

## 2019-03-17 RX ADMIN — LEVALBUTEROL HYDROCHLORIDE SCH MG: 0.63 SOLUTION RESPIRATORY (INHALATION) at 00:05

## 2019-03-17 RX ADMIN — LEVALBUTEROL HYDROCHLORIDE SCH MG: 0.63 SOLUTION RESPIRATORY (INHALATION) at 13:35

## 2019-03-17 RX ADMIN — MEROPENEM SCH MLS/HR: 500 INJECTION INTRAVENOUS at 21:00

## 2019-03-17 NOTE — NUR
Pt received resting in bed. Oral care provided. HOB elevated. Emotional support given. All 
meds given as ordered. Call bell within reach. Will monitor

## 2019-03-17 NOTE — NUR
Gave report to oncoming nurse. Patient asleep in bed. No pain or distress. Call light within 
reach.

## 2019-03-17 NOTE — PROGRESS NOTE
DATE:  03/17/2019

 

Medicine Progress Note 

 

SUBJECTIVE:  The patient is much more alert today, talkative on exam, alert and oriented

x3, back to normal baseline.  Son was at bedside.  He reports that she is much better

now.  The patient developed fever yesterday in which antibiotics were rearranged. 

 

OBJECTIVE:  VITAL SIGNS:  Temperature is 98, pulse 90, respiratory rate is 16, blood

pressure 169/77, pulse ox 100% on 2 L nasal cannula. 

GENERAL:  Not in acute distress.  Alert and oriented x3.  Cooperative on examination. 

HEENT:  Head is normocephalic and atraumatic.  Eyes; pupils are equal, round, and

reactive to light bilaterally.  Extraocular movements are intact bilaterally.  Throat,

no evidence of erythema or exudates in the posterior pharynx.  Has poor dentition. 

NECK:  Supple.  Good range of motion. 

PULMONARY:  Clear to auscultation bilaterally.  No wheezing, no rales, no rhonchi, no

crackles appreciated. 

CARDIOVASCULAR:  Positive S1, S2.  No murmurs, rubs, or gallops appreciated. 

ABDOMEN:  Soft, nondistended, and nontender to palpation.  Bowel sounds present. 

MUSCULOSKELETAL:  Unable to assess, at baseline. 

NEUROLOGICAL:  Unable to assess, at baseline. 

SKIN:  Intact.  Warm to touch.  Good cap refill. 

PSYCHIATRIC:  At baseline, alert and oriented x3, cooperative. 

EXTREMITIES:  No edema.  Good range of motion throughout.

 

LABORATORY DATA:  Show white count 5, hemoglobin 11.4, hematocrit 37, and platelets of

135.  Chemistry; sodium 138, potassium 4.1, chloride 99, bicarb 27, anion gap of 16, BUN

17, creatinine 0.69, glucose 128. 

 

MICROBIOLOGY:  Repeat blood cultures are pending today.

 

IMAGING STUDIES:  Chest x-ray shows mild basilar linear opacity likely atelectasis.

Developing __________. 

 

IMPRESSION:  

1. Sepsis secondary to urinary tract infection with underlying aspiration pneumonia, now

with a fever. 

2. Urinary tract infection with ESBL.  

3. Aspiration pneumonitis.

4. Moderate protein-calorie malnutrition with PEG tube, failed swallow evaluation after

a modified barium swallow. 

5. Baseline dementia.

6. History of atrial fibrillation, on anticoagulation, rate controlled.

7. History of cerebrovascular accident in the past.

 

PLAN:  At this time, repeat blood cultures have been collected.  The patient developed a

fever yesterday, antibiotics were rearranged per ID.  We will continue the same PEG tube

feeds at this time.  She is tolerating it well.  We are going to repeat swallow

evaluation tomorrow.  Get a.m. 

labs.  Work with PT and OT.  She is on DVT prophylaxis.  Plan to discharge home in the

next few days once the patient is much improved and the repeat cultures are negative. 

 

 

 

 

______________________________

MD NURA House/JESUS

D:  03/17/2019 13:49:17

T:  03/17/2019 16:26:49

Job #:  [id number]

## 2019-03-18 VITALS — DIASTOLIC BLOOD PRESSURE: 73 MMHG | SYSTOLIC BLOOD PRESSURE: 163 MMHG

## 2019-03-18 VITALS — SYSTOLIC BLOOD PRESSURE: 169 MMHG | DIASTOLIC BLOOD PRESSURE: 83 MMHG

## 2019-03-18 VITALS — SYSTOLIC BLOOD PRESSURE: 142 MMHG | DIASTOLIC BLOOD PRESSURE: 69 MMHG

## 2019-03-18 VITALS — SYSTOLIC BLOOD PRESSURE: 157 MMHG | DIASTOLIC BLOOD PRESSURE: 67 MMHG

## 2019-03-18 VITALS — DIASTOLIC BLOOD PRESSURE: 73 MMHG | SYSTOLIC BLOOD PRESSURE: 138 MMHG

## 2019-03-18 VITALS — SYSTOLIC BLOOD PRESSURE: 152 MMHG | DIASTOLIC BLOOD PRESSURE: 65 MMHG

## 2019-03-18 RX ADMIN — LEVOTHYROXINE SODIUM SCH MCG: 88 TABLET ORAL at 06:00

## 2019-03-18 RX ADMIN — MEROPENEM SCH MLS/HR: 500 INJECTION INTRAVENOUS at 03:22

## 2019-03-18 RX ADMIN — MEROPENEM SCH MLS/HR: 500 INJECTION INTRAVENOUS at 20:30

## 2019-03-18 RX ADMIN — DEXTROSE AND SODIUM CHLORIDE SCH MLS/HR: 5; 900 INJECTION, SOLUTION INTRAVENOUS at 14:00

## 2019-03-18 RX ADMIN — NORTRIPTYLINE HYDROCHLORIDE SCH MG: 25 CAPSULE ORAL at 20:17

## 2019-03-18 RX ADMIN — Medication SCH MG: at 09:00

## 2019-03-18 RX ADMIN — LEVALBUTEROL HYDROCHLORIDE SCH MG: 0.63 SOLUTION RESPIRATORY (INHALATION) at 00:20

## 2019-03-18 RX ADMIN — CASTOR OIL AND BALSAM, PERU SCH GM: 788; 87 OINTMENT TOPICAL at 09:00

## 2019-03-18 RX ADMIN — LEVALBUTEROL HYDROCHLORIDE SCH MG: 0.63 SOLUTION RESPIRATORY (INHALATION) at 20:00

## 2019-03-18 RX ADMIN — MEROPENEM SCH MLS/HR: 500 INJECTION INTRAVENOUS at 09:00

## 2019-03-18 RX ADMIN — METOPROLOL TARTRATE SCH MG: 25 TABLET, FILM COATED ORAL at 17:00

## 2019-03-18 RX ADMIN — MEROPENEM SCH MLS/HR: 500 INJECTION INTRAVENOUS at 15:00

## 2019-03-18 RX ADMIN — METOPROLOL TARTRATE SCH MG: 25 TABLET, FILM COATED ORAL at 09:00

## 2019-03-18 RX ADMIN — LEVALBUTEROL HYDROCHLORIDE SCH MG: 0.63 SOLUTION RESPIRATORY (INHALATION) at 07:38

## 2019-03-18 RX ADMIN — FUROSEMIDE SCH MG: 20 TABLET ORAL at 09:00

## 2019-03-18 RX ADMIN — Medication SCH ML: at 09:00

## 2019-03-18 RX ADMIN — LEVALBUTEROL HYDROCHLORIDE SCH MG: 0.63 SOLUTION RESPIRATORY (INHALATION) at 13:52

## 2019-03-18 RX ADMIN — LACTULOSE SCH GM: 20 SOLUTION ORAL at 09:00

## 2019-03-18 NOTE — NUR
Called Dr. Tee about the PEG tube being out. He told me to place a consult to Dr. DOREEN Gonzalez 
for replacement of PEG tube.

## 2019-03-18 NOTE — NUR
RCD PT AT BED PT IS CONFUSED GOT THE REPORT PEG TUBE DISLODGED IN THE NIGHT PT IS NPO  PT 
RESTING ON BED NO SIGNS OF ANY DISTRESS NOTED BED LOW AND LOCKED CALL LIGHT IN REACH

## 2019-03-18 NOTE — NUR
Patient's PEG tube was laying on the bed, with the bulb intact. Gauze dressing applied to 
site with tape. Dr. Tee notified and ordered to call surgery and the patient to be seen 
first thing in the morning. Patient in no pain or distress. Charge nurse notified.

## 2019-03-18 NOTE — NUR
Nutrition Intervention Note



RD Recommendation(s) for Physician: 

-When PEG replaced, resume TF of Jevity 1.2 with goal rate of 55 ml/hr

-Rec 20mL/hr of free water flushes for 10hr; additional per MD discretion    

-Diet per SLP/MD



Plan of Care: RD following, monitoring for tolerance and adequacy, TF rec  



Nutrition reason for involvement: Follow up 



RD Assessment

3/18: Follow up. Pt discussed during am rounds. Pt pulled out PEG this am, MD aware per RN, 
and plan for EGD and replacement today or tomorrow with addition of abdominal binder. Pt 
previously tolerating TF at goal rate per RN prior to pt pulling out PEG. No SLP evaluation 
noted per chart. Chart reviewed. Pt now on D5NS at 75 ml/hr. Will monitor and continue to 
follow. 



3/13  Chart reviewed. 84yo F, who was admitted for aspiration PNA. Pt came from a nursing 
home with PEG tube POA. Per RN Radha, pt ate 50% of her oatmeal and was coughing after 
drinking milk this AM. Pt was able to tolerate soft foods alright. Per son, pt was on a 
pureed diet with Ensure at the nursing home. Pt also got supplemental EN (bolus feeding of 
4x 250cc Fibersource) through PEG at night time if her PO intake during the day was low.   
No GI complains noted. Per son, pt was having significant weight loss due to ill-fitting 
denture about 8 months ago. Since the denture was removed, pt has been maintaining her 
weight at 110lb. SLP has been consulted. Spoke to Dr. Tee regarding RD recommendation. Dr. Tee agreed with plan; order has been placed. Will continue to monitor and follow. 



Principal Problems/Diagnoses: Aspiration PNA 



PMH: HTN, MI, CVA, hypothyroidism, dementia, stroke, CVA w/ R side weakness



GI: abdomen soft, non-tender, round, LBM  3/18



Skin: Stage II PU to back  



Labs: 3/17: Gluc 128, Ca 8.3



Meds: abx, MVI, lasix, synthroid, lactulose 



Ht: 63 in (approximation) 

Wt: 113.31lb

BMI: 20kg/m2

IBW: 115lb 



Malnutrition Evaluation (3/13/2019)

The patient does not meet criteria for a specified degree of malnutrition at this time. Will 
re-evaluate at follow-up as appropriate. 



Nutrition Prescription (Diet Order): TF jevity 1.2 at 55 ml/hr- held currently 



Estimated Nutritional Needs:

Calories:  1300  1820kcal (25-35kcal/kg/d) Weight used: Current BW 

Protein:  52  78g (1-1.5g/kg/d) Weight used: Current BW



Diet Adequacy:

Not meeting calorie needs, Not meeting protein needs





Diet Education Needs Assessment:

Diet education not indicated; pt on TF. 



Nutrition Care Level: mod 



Nutrition Diagnosis: Inadequate oral intake related to chronic illness as evidenced by pt 
requiring ONS and EN through PEG while at nursing home. 



Goal: Patient will meet % of estimated needs by follow up 



Progress: Progressing 



Interventions:

Composition, Rate, Route,  Recommended Modifications, Collaboration with other providers



Monitoring/Evaluation:

Total energy intake, Total protein intake, Formula/Solution,  Weight change





Signed: Orin Ybarra RD, LD, CNSC

## 2019-03-18 NOTE — PROGRESS NOTE
DATE:  03/18/2019

 

Medicine Progress Note 

 

SUBJECTIVE:  The patient is doing well today with no complaints.  Last night, she pulled

the PEG tube out.  GI has been consulted in the process of possibly placing PEG tube

today.  We are not sure how she pulled the PEG tube out.  We will apply a abdominal

binder after the second PEG tube is being placed. 

 

PHYSICAL EXAMINATION:

VITAL SIGNS:  Temperature 98.3, pulse 89, respiratory rate is 16, blood pressure 152/65,

pulse ox is 97% on 2 L nasal cannula. 

GENERAL:  Not in acute distress.  Alert and oriented x2.  Cooperative on examination. 

HEENT:  Head is normocephalic and atraumatic.  Eyes; pupils are equal, round, and

reactive to light bilaterally.  Extraocular movements are intact bilaterally.  Throat,

no evidence of erythema or exudates in the posterior pharynx.  Has poor dentition. 

NECK:  Supple.  Good range of motion. 

PULMONARY:  Clear to auscultation bilaterally.  No wheezing, no rales, no rhonchi, no

crackles appreciated. 

CARDIOVASCULAR:  Positive S1, S2.  No murmurs, rubs, or gallops appreciated. 

ABDOMEN:  Soft, nondistended, and nontender to palpation.  Bowel sounds present. 

MUSCULOSKELETAL:  Strength is 5/5 throughout.  No evidence of any muscle deficits on

examination.  No weakness appreciated. 

NEUROLOGICAL:  Cranial nerves II through XII grossly intact.  No evidence of any

neurological deficits on exam. 

SKIN:  Intact.  Warm to touch.  Good cap refill. 

PSYCHIATRIC:  Normal affect and mood. 

EXTREMITIES:  No edema.  Good range of motion throughout.

LABORATORY DATA:  Lab findings show white count is 5, hemoglobin 11.4, hematocrit 37,

and platelets of 135.  Chemistry, sodium 138, potassium 4.1, chloride 99, bicarb 27,

anion gap is 16, BUN 17, creatinine is 0.69, glucose is 128, calcium is 8.3, magnesium

is 1.7.  Blood cultures repeat were negative.  Initial blood cultures were negative.

Urine cultures and sputum cultures as noted. 

 

IMAGING STUDIES:  None.

 

IMPRESSION:  

1. Sepsis secondary to urinary tract infection with underlying aspiration pneumonia with

improved fever. 

2. Urinary tract infection with. 

3. extended-spectrum beta-lactamase.

4. Aspiration pneumonitis.

5. Moderate protein-calorie malnutrition with PEG tube, failed swallow evaluation, now

pulled her PEG tube overnight. 

6. Baseline dementia.

7. History of atrial fibrillation, on anticoagulation, rate controlled.

8. History of cerebrovascular accident in the past.

 

PLAN:  At this time, repeat blood cultures have been collected, which showed no growth

to date.  She did pull her PEG tube today.  GI has been consulted with possible

insertion today.  Continue 

working with PT and OT daily.  She is now afebrile.  White count is normal.  Follow ID

recommendations.  We will continue same plan of care.  Get a.m. labs. 

 

 

 

 

______________________________

MD [facility]

D:  03/18/2019 13:42:08

T:  03/18/2019 16:46:58

Job #:  [id number]

## 2019-03-19 VITALS — DIASTOLIC BLOOD PRESSURE: 72 MMHG | SYSTOLIC BLOOD PRESSURE: 165 MMHG

## 2019-03-19 VITALS — SYSTOLIC BLOOD PRESSURE: 165 MMHG | DIASTOLIC BLOOD PRESSURE: 72 MMHG

## 2019-03-19 VITALS — DIASTOLIC BLOOD PRESSURE: 93 MMHG | SYSTOLIC BLOOD PRESSURE: 184 MMHG

## 2019-03-19 VITALS — SYSTOLIC BLOOD PRESSURE: 150 MMHG | DIASTOLIC BLOOD PRESSURE: 74 MMHG

## 2019-03-19 VITALS — SYSTOLIC BLOOD PRESSURE: 157 MMHG | DIASTOLIC BLOOD PRESSURE: 70 MMHG

## 2019-03-19 VITALS — SYSTOLIC BLOOD PRESSURE: 162 MMHG | DIASTOLIC BLOOD PRESSURE: 76 MMHG

## 2019-03-19 VITALS — SYSTOLIC BLOOD PRESSURE: 172 MMHG | DIASTOLIC BLOOD PRESSURE: 72 MMHG

## 2019-03-19 VITALS — DIASTOLIC BLOOD PRESSURE: 61 MMHG | SYSTOLIC BLOOD PRESSURE: 140 MMHG

## 2019-03-19 LAB
ANION GAP SERPL CALC-SCNC: 12.2 MMOL/L (ref 8–16)
BASOPHILS # BLD AUTO: 0 10*3/UL (ref 0–0.1)
BASOPHILS NFR BLD AUTO: 0.4 % (ref 0–1)
BUN SERPL-MCNC: 12 MG/DL (ref 7–26)
BUN/CREAT SERPL: 18 (ref 6–25)
CALCIUM SERPL-MCNC: 8.5 MG/DL (ref 8.4–10.2)
CHLORIDE SERPL-SCNC: 106 MMOL/L (ref 98–107)
CO2 SERPL-SCNC: 27 MMOL/L (ref 22–29)
DEPRECATED NEUTROPHILS # BLD AUTO: 3.5 10*3/UL (ref 2.1–6.9)
EGFRCR SERPLBLD CKD-EPI 2021: > 60 ML/MIN (ref 60–?)
EOSINOPHIL # BLD AUTO: 0.1 10*3/UL (ref 0–0.4)
EOSINOPHIL NFR BLD AUTO: 1.6 % (ref 0–6)
ERYTHROCYTE [DISTWIDTH] IN CORD BLOOD: 13.5 % (ref 11.7–14.4)
GLUCOSE SERPLBLD-MCNC: 114 MG/DL (ref 74–118)
HCT VFR BLD AUTO: 34.7 % (ref 34.2–44.1)
HGB BLD-MCNC: 10.9 G/DL (ref 12–16)
LYMPHOCYTES # BLD: 1 10*3/UL (ref 1–3.2)
LYMPHOCYTES NFR BLD AUTO: 20.4 % (ref 18–39.1)
MCH RBC QN AUTO: 30 PG (ref 28–32)
MCHC RBC AUTO-ENTMCNC: 31.4 G/DL (ref 31–35)
MCV RBC AUTO: 95.6 FL (ref 81–99)
MONOCYTES # BLD AUTO: 0.4 10*3/UL (ref 0.2–0.8)
MONOCYTES NFR BLD AUTO: 8.6 % (ref 4.4–11.3)
NEUTS SEG NFR BLD AUTO: 68.4 % (ref 38.7–80)
PLATELET # BLD AUTO: 257 X10E3/UL (ref 140–360)
POTASSIUM SERPL-SCNC: 4.2 MMOL/L (ref 3.5–5.1)
RBC # BLD AUTO: 3.63 X10E6/UL (ref 3.6–5.1)
SODIUM SERPL-SCNC: 141 MMOL/L (ref 136–145)

## 2019-03-19 PROCEDURE — 0DH63UZ INSERTION OF FEEDING DEVICE INTO STOMACH, PERCUTANEOUS APPROACH: ICD-10-PCS | Performed by: INTERNAL MEDICINE

## 2019-03-19 RX ADMIN — MEROPENEM SCH MLS/HR: 500 INJECTION INTRAVENOUS at 03:10

## 2019-03-19 RX ADMIN — MEROPENEM SCH MLS/HR: 500 INJECTION INTRAVENOUS at 16:40

## 2019-03-19 RX ADMIN — LEVALBUTEROL HYDROCHLORIDE SCH MG: 0.63 SOLUTION RESPIRATORY (INHALATION) at 13:00

## 2019-03-19 RX ADMIN — LEVOTHYROXINE SODIUM SCH MCG: 88 TABLET ORAL at 05:28

## 2019-03-19 RX ADMIN — MEROPENEM SCH MLS/HR: 500 INJECTION INTRAVENOUS at 21:37

## 2019-03-19 RX ADMIN — LEVALBUTEROL HYDROCHLORIDE SCH MG: 0.63 SOLUTION RESPIRATORY (INHALATION) at 07:35

## 2019-03-19 RX ADMIN — Medication SCH MG: at 09:00

## 2019-03-19 RX ADMIN — FUROSEMIDE SCH MG: 20 TABLET ORAL at 09:00

## 2019-03-19 RX ADMIN — CASTOR OIL AND BALSAM, PERU SCH GM: 788; 87 OINTMENT TOPICAL at 09:00

## 2019-03-19 RX ADMIN — DEXTROSE AND SODIUM CHLORIDE SCH MLS/HR: 5; 900 INJECTION, SOLUTION INTRAVENOUS at 03:20

## 2019-03-19 RX ADMIN — HYDRALAZINE HYDROCHLORIDE PRN MG: 20 INJECTION INTRAMUSCULAR; INTRAVENOUS at 12:35

## 2019-03-19 RX ADMIN — Medication SCH ML: at 09:00

## 2019-03-19 RX ADMIN — LEVALBUTEROL HYDROCHLORIDE SCH MG: 0.63 SOLUTION RESPIRATORY (INHALATION) at 19:35

## 2019-03-19 RX ADMIN — LEVALBUTEROL HYDROCHLORIDE SCH MG: 0.63 SOLUTION RESPIRATORY (INHALATION) at 00:30

## 2019-03-19 RX ADMIN — DEXTROSE AND SODIUM CHLORIDE SCH MLS/HR: 5; 900 INJECTION, SOLUTION INTRAVENOUS at 16:40

## 2019-03-19 RX ADMIN — MEROPENEM SCH MLS/HR: 500 INJECTION INTRAVENOUS at 09:00

## 2019-03-19 RX ADMIN — METOPROLOL TARTRATE SCH MG: 25 TABLET, FILM COATED ORAL at 09:00

## 2019-03-19 RX ADMIN — LACTULOSE SCH GM: 20 SOLUTION ORAL at 09:00

## 2019-03-19 RX ADMIN — METOPROLOL TARTRATE SCH MG: 25 TABLET, FILM COATED ORAL at 17:00

## 2019-03-19 RX ADMIN — NORTRIPTYLINE HYDROCHLORIDE SCH MG: 25 CAPSULE ORAL at 21:40

## 2019-03-19 NOTE — NUR
PT BACK AFTER PROCEDURE PT IS ALERT AND RETING ON BED VITALS CHECKED BED LOW AND LOCKED CALL 
LIGHT IN REACH

## 2019-03-19 NOTE — NUR
A/C TO DR AALIYAH NAVAS ORDER TUBE FEEDING  JEVITY 1.2  55 ML STARTED AND FLUSHED  WITH WATER 
100 ML EVERY 6 HRS

## 2019-03-19 NOTE — NUR
RCD PT AT BED PT IS CONFUSED ASSESSMENT DONE  PT  NPO  FOR PROCEDURE IV PATENT AND RUNNING 
75 ML /HR  PT RESTING ON BED NO SIGNS OF ANY DISTRESS NOTED BED LOW AND LOCKED CALL LIGHT IN 
REACH

## 2019-03-19 NOTE — PROGRESS NOTE
DATE:  03/19/2019

 

Medicine Progress Note 

 

SUBJECTIVE:  The patient is alert and awake on examination, she is oriented as well.

She is scheduled to have a PEG tube placement later today.  Blood pressure is slightly

elevated for which we added p.r.n. hydralazine.  No overnight events. 

 

PHYSICAL EXAMINATION:

VITAL SIGNS:  Temperature is 97, pulse 96, respiratory rate is 18, blood pressure is

172/82, and pulse ox 95% on 2 L nasal cannula. 

GENERAL:  Not in acute distress.  Alert and oriented x3.  Cooperative on examination. 

HEENT:  Head is normocephalic and atraumatic.  Eyes; pupils are equal, round, and

reactive to light bilaterally.  Extraocular movements are intact bilaterally.  Throat,

no evidence of erythema or exudates in the posterior pharynx.  Has poor dentition. 

NECK:  Supple.  Good range of motion. 

PULMONARY:  Clear to auscultation bilaterally.  No wheezing, no rales, no rhonchi, no

crackles appreciated. 

CARDIOVASCULAR:  Positive S1, S2.  No murmurs, rubs, or gallops appreciated. 

ABDOMEN:  Soft, nondistended, and nontender to palpation.  Bowel sounds present. 

MUSCULOSKELETAL:  At baseline. 

NEUROLOGICAL:  At baseline. 

SKIN:  Intact.  Warm to touch.  Good cap refill. 

PSYCHIATRIC:  At baseline. 

EXTREMITIES:  No edema.  Good range of motion throughout.

LAB FINDINGS:  Show white count 5.1, hemoglobin 10.9, hematocrit is 34.7, and platelets

of 257.  Chemistry; sodium 141, potassium 4.3, chloride 106, bicarb is 27, anion gap of

12, BUN is 12, creatinine is 0.68, glucose is 114, and calcium is 8.5. 

 

MICROBIOLOGY:  Noted.

 

IMAGING STUDIES:  Nothing new.

 

IMPRESSION:  

1. Sepsis secondary to urinary tract infection with underlying aspiration pneumonia with

improved fever. 

2. Extended-spectrum beta-lactamase urinary tract infection.

3. Aspiration pneumonitis.

4. Moderate protein-calorie malnutrition with percutaneous endoscopic gastrostomy tube,

now status post pulled and dislodged and we will replace later today. 

5. Baseline dementia.

6. History of atrial fibrillation, on anticoagulation, rate controlled.

7. History of cerebrovascular accident in the past.

 

PLAN:  At this time, she continues to be on IV antibiotics, being managed by ID.  Her

PEG tube will be placed later today by GI and we will determine when the tube feeds can

be started and re-initiation of anticoagulation per GI.  Continue work with PT and OT

daily.  Labs are stable and 

vital signs are stable as well.  Continue to follow ID and GI recommendations.  Hoping

that the patient can be discharged shortly in the next 1 to 2 days. 

 

 

 

 

______________________________

MD [facility]

D:  03/19/2019 12:32:37

T:  03/19/2019 15:11:30

Job #:  [id number]

## 2019-03-19 NOTE — NUR
PT RESTING ON BED WITH HOB ELEVATED.NO S/S OF DISTRESS NOTED.RESPIRATIONS EVEN/NON 
LABORED.PEG TUBE INTACT,PATENT.ABD BINDER ON.JEVITY 1.2 RUNNING AT 55ML/HR.RESIDUAL 
CHECKED,NONE NOTED AT THIS TIME.BED IN LOWEST/LOCKED POSITION.BED ALARM ON.WILL CONTINUE TO 
MONITOR.

## 2019-03-19 NOTE — OPERATIVE REPORT
DATE OF PROCEDURE:  03/19/2019

 

SURGEON:  Demetrio Gonzalez MD

 

PROCEDURE:  EGD with PEG tube replacement.

 

INDICATIONS FOR PROCEDURE:  Oropharyngeal dysphagia, G-tube dependent, G-tube was

dislodged. 

 

MEDICATION:  The patient was done under MAC, please see anesthesiologist's note.

 

PROCEDURE IN DETAIL:  With the patient in the supine position, a flexible fiberoptic

Olympus gastroscope was introduced into the esophagus under direct visualization without

any difficulty.  A long segment of what appears to be Zarco's esophagus was noted.  It

extended approximately 11 cm from the GE junction proximally.  The scope was then

advanced with ease into the stomach traversing a small hiatal hernia.  Mucosa overlying

the antrum and the body revealed some patchy areas of erythema.  Pylorus was intubated

with ease and the scope was advanced all the way to the second portion of the duodenum.

The scope was then withdrawn slowly.  Mucosa overlying the proximal second portion and

duodenal bulb appeared to be within normal limits.  The scope was then withdrawn back

into the stomach and retroflexed.  The mucosa overlying the fundus and the cardia

appeared to be within normal limits.  The previously described hiatal hernia was also

noted in the retroflexed position.  The scope was then straightened out and then PEG

tube replacement was carried out through the old G-tube stoma.  The scope was

subsequently withdrawn after documenting a good positioning of the intragastric bumper.

The patient tolerated the procedure well. 

 

IMPRESSION:  

1. Long segment of Zarco's esophagus.

2. Small hiatal hernia.

3. Gastritis.

4. PEG tube replacement carried out in the usual fashion through the old G-tube stoma.

The patient tolerated the procedure well. 

 

PLAN:  Can use G-tube upon return to floor.

 

 

 

 

______________________________

Demetrio Gonzalez MD

 

[facility]

D:  03/19/2019 16:07:40

T:  03/19/2019 22:53:07

Job #:  [id number]

 

cc:            Kvng Tee MD

## 2019-03-20 VITALS — DIASTOLIC BLOOD PRESSURE: 82 MMHG | SYSTOLIC BLOOD PRESSURE: 161 MMHG

## 2019-03-20 VITALS — DIASTOLIC BLOOD PRESSURE: 66 MMHG | SYSTOLIC BLOOD PRESSURE: 136 MMHG

## 2019-03-20 VITALS — DIASTOLIC BLOOD PRESSURE: 64 MMHG | SYSTOLIC BLOOD PRESSURE: 128 MMHG

## 2019-03-20 VITALS — SYSTOLIC BLOOD PRESSURE: 136 MMHG | DIASTOLIC BLOOD PRESSURE: 66 MMHG

## 2019-03-20 VITALS — DIASTOLIC BLOOD PRESSURE: 59 MMHG | SYSTOLIC BLOOD PRESSURE: 129 MMHG

## 2019-03-20 VITALS — DIASTOLIC BLOOD PRESSURE: 89 MMHG | SYSTOLIC BLOOD PRESSURE: 166 MMHG

## 2019-03-20 VITALS — DIASTOLIC BLOOD PRESSURE: 85 MMHG | SYSTOLIC BLOOD PRESSURE: 179 MMHG

## 2019-03-20 VITALS — DIASTOLIC BLOOD PRESSURE: 96 MMHG | SYSTOLIC BLOOD PRESSURE: 199 MMHG

## 2019-03-20 LAB
ANION GAP SERPL CALC-SCNC: 8.9 MMOL/L (ref 8–16)
BASOPHILS # BLD AUTO: 0 10*3/UL (ref 0–0.1)
BASOPHILS NFR BLD AUTO: 0.2 % (ref 0–1)
BUN SERPL-MCNC: 14 MG/DL (ref 7–26)
BUN/CREAT SERPL: 19 (ref 6–25)
CALCIUM SERPL-MCNC: 8.6 MG/DL (ref 8.4–10.2)
CHLORIDE SERPL-SCNC: 107 MMOL/L (ref 98–107)
CO2 SERPL-SCNC: 28 MMOL/L (ref 22–29)
DEPRECATED NEUTROPHILS # BLD AUTO: 3.4 10*3/UL (ref 2.1–6.9)
EGFRCR SERPLBLD CKD-EPI 2021: > 60 ML/MIN (ref 60–?)
EOSINOPHIL # BLD AUTO: 0.1 10*3/UL (ref 0–0.4)
EOSINOPHIL NFR BLD AUTO: 2.3 % (ref 0–6)
ERYTHROCYTE [DISTWIDTH] IN CORD BLOOD: 13.7 % (ref 11.7–14.4)
GLUCOSE SERPLBLD-MCNC: 154 MG/DL (ref 74–118)
HCT VFR BLD AUTO: 32.4 % (ref 34.2–44.1)
HGB BLD-MCNC: 10 G/DL (ref 12–16)
LYMPHOCYTES # BLD: 0.9 10*3/UL (ref 1–3.2)
LYMPHOCYTES NFR BLD AUTO: 19 % (ref 18–39.1)
MCH RBC QN AUTO: 29.7 PG (ref 28–32)
MCHC RBC AUTO-ENTMCNC: 30.9 G/DL (ref 31–35)
MCV RBC AUTO: 96.1 FL (ref 81–99)
MONOCYTES # BLD AUTO: 0.4 10*3/UL (ref 0.2–0.8)
MONOCYTES NFR BLD AUTO: 8.1 % (ref 4.4–11.3)
NEUTS SEG NFR BLD AUTO: 70 % (ref 38.7–80)
PLATELET # BLD AUTO: 270 X10E3/UL (ref 140–360)
POTASSIUM SERPL-SCNC: 3.9 MMOL/L (ref 3.5–5.1)
RBC # BLD AUTO: 3.37 X10E6/UL (ref 3.6–5.1)
SODIUM SERPL-SCNC: 140 MMOL/L (ref 136–145)

## 2019-03-20 RX ADMIN — METOPROLOL TARTRATE SCH MG: 25 TABLET, FILM COATED ORAL at 10:00

## 2019-03-20 RX ADMIN — LEVALBUTEROL HYDROCHLORIDE SCH MG: 0.63 SOLUTION RESPIRATORY (INHALATION) at 13:00

## 2019-03-20 RX ADMIN — MEROPENEM SCH MLS/HR: 500 INJECTION INTRAVENOUS at 10:40

## 2019-03-20 RX ADMIN — METOPROLOL TARTRATE SCH MG: 25 TABLET, FILM COATED ORAL at 17:40

## 2019-03-20 RX ADMIN — LACTULOSE SCH GM: 20 SOLUTION ORAL at 10:00

## 2019-03-20 RX ADMIN — NORTRIPTYLINE HYDROCHLORIDE SCH MG: 25 CAPSULE ORAL at 20:29

## 2019-03-20 RX ADMIN — HYDRALAZINE HYDROCHLORIDE PRN MG: 20 INJECTION INTRAMUSCULAR; INTRAVENOUS at 20:29

## 2019-03-20 RX ADMIN — MEROPENEM SCH MLS/HR: 500 INJECTION INTRAVENOUS at 03:25

## 2019-03-20 RX ADMIN — CASTOR OIL AND BALSAM, PERU SCH GM: 788; 87 OINTMENT TOPICAL at 09:00

## 2019-03-20 RX ADMIN — LEVALBUTEROL HYDROCHLORIDE SCH MG: 0.63 SOLUTION RESPIRATORY (INHALATION) at 01:35

## 2019-03-20 RX ADMIN — Medication SCH ML: at 10:00

## 2019-03-20 RX ADMIN — RIVAROXABAN SCH MG: 15 TABLET, FILM COATED ORAL at 20:29

## 2019-03-20 RX ADMIN — LEVALBUTEROL HYDROCHLORIDE SCH MG: 0.63 SOLUTION RESPIRATORY (INHALATION) at 19:38

## 2019-03-20 RX ADMIN — MEROPENEM SCH MLS/HR: 500 INJECTION INTRAVENOUS at 20:29

## 2019-03-20 RX ADMIN — Medication SCH MG: at 10:00

## 2019-03-20 RX ADMIN — DEXTROSE AND SODIUM CHLORIDE SCH MLS/HR: 5; 900 INJECTION, SOLUTION INTRAVENOUS at 05:24

## 2019-03-20 RX ADMIN — LEVOTHYROXINE SODIUM SCH MCG: 88 TABLET ORAL at 05:23

## 2019-03-20 RX ADMIN — ACETAMINOPHEN PRN MG: 325 SOLUTION ORAL at 01:13

## 2019-03-20 RX ADMIN — MEROPENEM SCH MLS/HR: 500 INJECTION INTRAVENOUS at 15:35

## 2019-03-20 RX ADMIN — ACETAMINOPHEN PRN MG: 325 SOLUTION ORAL at 20:29

## 2019-03-20 RX ADMIN — FUROSEMIDE SCH MG: 20 TABLET ORAL at 10:00

## 2019-03-20 RX ADMIN — LEVALBUTEROL HYDROCHLORIDE SCH MG: 0.63 SOLUTION RESPIRATORY (INHALATION) at 07:00

## 2019-03-20 NOTE — NUR
CM called and spoke to pt's son Yasmany Jarquin [phone] and reminded him of Medicare 
Rights. He verbalized understanding. Signed copy placed in chart. Copy in pt's room. 



Mr. Jarquin asked about discharge plan. Informed him that per MD's note, likely will 
discharge tomorrow. He stated that no one has called to talk to him regarding plan. CM will 
have floor RN call him and update him on treatment plan.

## 2019-03-20 NOTE — PROGRESS NOTE
DATE:  03/20/2019

 

Medicine Progress Note 

 

SUBJECTIVE:  The patient is a little bit confused today on examination, but the patient

perked up as I continued to be in the room.  PEG tube has been ordered and PEG tube was

placed on yesterday.  She is tolerating tube feeds well.  We will monitor her overnight

before being discharged tomorrow. 

 

PHYSICAL EXAMINATION:

VITAL SIGNS:  Temperature is 96.3, pulse 77, respiratory rate is 20, blood pressure is

129/69, and pulse ox is 100% on nasal cannula. 

GENERAL:  No acute distress.  Alert and oriented x3.  Cooperative on examination. 

HEENT:  Head is normocephalic and atraumatic.  Eyes; pupils are equal, round, and

reactive to light bilaterally.  Extraocular movements are intact bilaterally.  Throat,

no evidence of erythema or exudates in the posterior pharynx.  Has poor dentition. 

NECK:  Supple with good range of motion. 

PULMONARY:  Clear to auscultation bilaterally.  No wheezing, no rales, no rhonchi, no

crackles appreciated. 

CARDIOVASCULAR:  Positive S1, S2.  No murmurs, rubs, or gallops appreciated. 

ABDOMEN:  Soft, nondistended, and nontender to palpation.  Bowel sounds present. 

MUSCULOSKELETAL:  Strength is 5/5 throughout.  No evidence of any muscle deficits on

examination.  No weakness appreciated. 

NEUROLOGICAL:  Cranial nerves II through XII grossly intact.  No evidence of any

neurological deficits on exam. 

SKIN:  Intact.  Warm to touch.  Good cap refill. 

PSYCHIATRIC:  Normal affect and mood. 

EXTREMITIES:  No edema.  Good range of motion throughout.

LABORATORY DATA:  Lab findings show white count of 4.7, hemoglobin of 10, hematocrit of

32, platelets of 270.  Chemistry; sodium 140, potassium 3.9, chloride is 107, bicarb 28,

anion gap of 8.9, BUN 14, creatinine is 0.72, glucose is 154, calcium is 8.6. 

 

IMAGING STUDIES:  Nothing new.

 

IMPRESSION:  

1. Sepsis secondary to urinary tract infection with underlying aspiration pneumonia with

improved fever and white count. 

2. Urinary tract infection with extended-spectrum beta-lactamase.

3. Aspiration pneumonitis.

4. Moderate protein calorie malnutrition, now status post PEG tube placement on

03/19/2019 by Gastroenterology. 

5. Baseline dementia.

6. History of atrial fibrillation, on anticoagulation.

7. History of cerebrovascular accident in the past, bed-bound.

 

PLAN:  At this time, continue with IV antibiotics for now.  We will need to get the oral

antibiotic regimen by ID hopefully today as the plan is to discharge tomorrow.  She

already has a PEG tube placed, she is already on tube feeds, we will continue the same

plan of care for now.  Work with PT and OT daily.  Labs reviewed and stable, we will get

a.m. labs.  We will await for ID final recommendations for oral antibiotic therapy.

Likely, she will be discharged tomorrow 

as the patient is currently doing well today, but slightly confused, we will need to

monitor closely, it could be secondary to anesthesia from the PEG tube placement

yesterday. 

 

 

 

 

______________________________

MD [facility]

D:  03/20/2019 11:00:22

T:  03/20/2019 13:11:15

Job #:  [id number]

## 2019-03-21 VITALS — SYSTOLIC BLOOD PRESSURE: 154 MMHG | DIASTOLIC BLOOD PRESSURE: 75 MMHG

## 2019-03-21 VITALS — SYSTOLIC BLOOD PRESSURE: 154 MMHG | DIASTOLIC BLOOD PRESSURE: 69 MMHG

## 2019-03-21 VITALS — SYSTOLIC BLOOD PRESSURE: 123 MMHG | DIASTOLIC BLOOD PRESSURE: 72 MMHG

## 2019-03-21 VITALS — SYSTOLIC BLOOD PRESSURE: 145 MMHG | DIASTOLIC BLOOD PRESSURE: 86 MMHG

## 2019-03-21 VITALS — DIASTOLIC BLOOD PRESSURE: 77 MMHG | SYSTOLIC BLOOD PRESSURE: 161 MMHG

## 2019-03-21 VITALS — DIASTOLIC BLOOD PRESSURE: 74 MMHG | SYSTOLIC BLOOD PRESSURE: 158 MMHG

## 2019-03-21 LAB
ANION GAP SERPL CALC-SCNC: 10 MMOL/L (ref 8–16)
BASOPHILS # BLD AUTO: 0 10*3/UL (ref 0–0.1)
BASOPHILS NFR BLD AUTO: 0.4 % (ref 0–1)
BUN SERPL-MCNC: 17 MG/DL (ref 7–26)
BUN/CREAT SERPL: 25 (ref 6–25)
CALCIUM SERPL-MCNC: 9.3 MG/DL (ref 8.4–10.2)
CHLORIDE SERPL-SCNC: 102 MMOL/L (ref 98–107)
CO2 SERPL-SCNC: 28 MMOL/L (ref 22–29)
DEPRECATED NEUTROPHILS # BLD AUTO: 3.4 10*3/UL (ref 2.1–6.9)
EGFRCR SERPLBLD CKD-EPI 2021: > 60 ML/MIN (ref 60–?)
EOSINOPHIL # BLD AUTO: 0.1 10*3/UL (ref 0–0.4)
EOSINOPHIL NFR BLD AUTO: 2.7 % (ref 0–6)
ERYTHROCYTE [DISTWIDTH] IN CORD BLOOD: 13.5 % (ref 11.7–14.4)
GLUCOSE SERPLBLD-MCNC: 167 MG/DL (ref 74–118)
HCT VFR BLD AUTO: 36 % (ref 34.2–44.1)
HGB BLD-MCNC: 11.2 G/DL (ref 12–16)
LYMPHOCYTES # BLD: 1.1 10*3/UL (ref 1–3.2)
LYMPHOCYTES NFR BLD AUTO: 21.6 % (ref 18–39.1)
MCH RBC QN AUTO: 29.8 PG (ref 28–32)
MCHC RBC AUTO-ENTMCNC: 31.1 G/DL (ref 31–35)
MCV RBC AUTO: 95.7 FL (ref 81–99)
MONOCYTES # BLD AUTO: 0.4 10*3/UL (ref 0.2–0.8)
MONOCYTES NFR BLD AUTO: 7.6 % (ref 4.4–11.3)
NEUTS SEG NFR BLD AUTO: 66.3 % (ref 38.7–80)
PLATELET # BLD AUTO: 328 X10E3/UL (ref 140–360)
POTASSIUM SERPL-SCNC: 4 MMOL/L (ref 3.5–5.1)
RBC # BLD AUTO: 3.76 X10E6/UL (ref 3.6–5.1)
SODIUM SERPL-SCNC: 136 MMOL/L (ref 136–145)

## 2019-03-21 RX ADMIN — LEVALBUTEROL HYDROCHLORIDE SCH MG: 0.63 SOLUTION RESPIRATORY (INHALATION) at 19:53

## 2019-03-21 RX ADMIN — FUROSEMIDE SCH MG: 20 TABLET ORAL at 10:01

## 2019-03-21 RX ADMIN — CASTOR OIL AND BALSAM, PERU SCH GM: 788; 87 OINTMENT TOPICAL at 18:14

## 2019-03-21 RX ADMIN — DEXTROSE AND SODIUM CHLORIDE SCH MLS/HR: 5; 900 INJECTION, SOLUTION INTRAVENOUS at 22:00

## 2019-03-21 RX ADMIN — MEROPENEM SCH MLS/HR: 500 INJECTION INTRAVENOUS at 15:00

## 2019-03-21 RX ADMIN — Medication SCH MG: at 10:00

## 2019-03-21 RX ADMIN — RIVAROXABAN SCH MG: 15 TABLET, FILM COATED ORAL at 22:00

## 2019-03-21 RX ADMIN — METOPROLOL TARTRATE SCH MG: 25 TABLET, FILM COATED ORAL at 17:55

## 2019-03-21 RX ADMIN — NORTRIPTYLINE HYDROCHLORIDE SCH MG: 25 CAPSULE ORAL at 22:00

## 2019-03-21 RX ADMIN — LEVOTHYROXINE SODIUM SCH MCG: 88 TABLET ORAL at 05:41

## 2019-03-21 RX ADMIN — LACTULOSE SCH GM: 20 SOLUTION ORAL at 10:00

## 2019-03-21 RX ADMIN — MEROPENEM SCH MLS/HR: 500 INJECTION INTRAVENOUS at 02:42

## 2019-03-21 RX ADMIN — LEVALBUTEROL HYDROCHLORIDE SCH MG: 0.63 SOLUTION RESPIRATORY (INHALATION) at 13:00

## 2019-03-21 RX ADMIN — DEXTROSE AND SODIUM CHLORIDE SCH MLS/HR: 5; 900 INJECTION, SOLUTION INTRAVENOUS at 08:40

## 2019-03-21 RX ADMIN — METOPROLOL TARTRATE SCH MG: 25 TABLET, FILM COATED ORAL at 10:01

## 2019-03-21 RX ADMIN — DEXTROSE AND SODIUM CHLORIDE SCH MLS/HR: 5; 900 INJECTION, SOLUTION INTRAVENOUS at 02:30

## 2019-03-21 RX ADMIN — Medication SCH ML: at 10:00

## 2019-03-21 RX ADMIN — LEVALBUTEROL HYDROCHLORIDE SCH MG: 0.63 SOLUTION RESPIRATORY (INHALATION) at 07:00

## 2019-03-21 RX ADMIN — MEROPENEM SCH MLS/HR: 500 INJECTION INTRAVENOUS at 09:00

## 2019-03-21 RX ADMIN — LEVALBUTEROL HYDROCHLORIDE SCH MG: 0.63 SOLUTION RESPIRATORY (INHALATION) at 00:05

## 2019-03-21 NOTE — PROGRESS NOTE
DATE:  03/21/2019  

 

SUBJECTIVE:  The patient is doing much better now, she is tolerating her tube feeds

well.  We have Speech eval that need to come to re-evaluate the patient.  We are still

waiting on final recommendations from ID in terms of antibiotic regimen.  No overnight

events. 

 

OBJECTIVE:  VITAL SIGNS:  Temperature is 96.4, pulse 94, respiratory rate is 20, blood

pressure 154/69.  She is on nasal cannula 2 L. 

GENERAL:  Not in acute distress.  Alert and oriented x3.  Cooperative on examination. 

HEENT:  Head is normocephalic and atraumatic.  Eyes; pupils are equal, round, and

reactive to light bilaterally.  Extraocular movements are intact bilaterally.  Throat,

no evidence of erythema or exudates in the posterior pharynx.  Has poor dentition. 

NECK:  Supple.  Good range of motion. 

PULMONARY:  Clear to auscultation bilaterally.  No wheezing, no rales, no rhonchi, no

crackles appreciated. 

CARDIOVASCULAR:  Positive S1, S2.  No murmurs, rubs, or gallops appreciated. 

ABDOMEN:  Soft, nondistended, and nontender to palpation.  Bowel sounds present. 

MUSCULOSKELETAL:  Strength is 5/5 throughout.  No evidence of any muscle deficits on

examination.  No weakness appreciated. 

NEUROLOGICAL:  Cranial nerves II through XII grossly intact.  No evidence of any

neurological deficits on exam. 

SKIN:  Intact.  Warm to touch.  Good cap refill. 

PSYCHIATRIC:  Normal affect and mood. 

EXTREMITIES:  No edema.  Good range of motion throughout.

 

MICROBIOLOGY:  Noted.

 

IMAGING:  Noted.

 

IMPRESSION:  

1. Sepsis secondary to urinary tract infection with underlying aspiration pneumonia with

improved fever and white count. 

2. Urinary tract infection with extended-spectrum beta-lactamase.

3. Aspiration pneumonitis.

4. Moderate protein calorie malnutrition, status post PEG tube placement by

Gastroenterology, on 03/19/2019, now tolerating feeds. 

5. Baseline dementia.

6. History of atrial fibrillation, on anticoagulation.

7. History of cerebrovascular accident in the past, bed-bound.

 

PLAN:  At this time, she is on IV antibiotics.  I need to get to oral regimen in order

for the patient to be discharged home today.  I will communicate with ID as well.  She

is tolerating her PEG tube feeds well.  She is working with PT and OT daily.  Labs

reviewed and stable.  We are waiting for Speech eval that was supposed to be done

earlier in the week, which was not.  If the comes back and we have a plan from Speech

Therapy and we have a plan on which oral 

antibiotics to discharge home on, the patient is cleared for discharge back to the

nursing home.  Discussed with nursing staff. 

 

 

 

 

______________________________

MD [facility]

D:  03/21/2019 11:15:14

T:  03/21/2019 13:21:33

Job #:  [id number]

## 2019-03-21 NOTE — NUR
Notified Dr. Tee that case management needs to resend clinicals to nursing home prior to 
transferring back to nursing home but case management has left for the day, placed order to 
be done in the am 3/22 so patient can be discharged back to nursing home. Patient's son 
Michael is here, he was also updated on plan of care at this time.

## 2019-03-21 NOTE — PROGRESS NOTE
DATE:    

 

SUBJECTIVE:  Ms. Jarquin is doing well.  She has no complaints.

 

REVIEW OF SYSTEMS:

HEENT:  Negative. 

PULMONARY:  Negative. 

CARDIAC:  Negative. 

GI:  Negative.

 

PHYSICAL EXAMINATION:

GENERAL:  She is currently alert and oriented, does not seem to be in acute distress. 

VITAL SIGNS:  Stable.  Afebrile. 

HEENT:  She is not icteric. 

NECK:  Supple. 

CHEST:  Clear bilateral. 

HEART:  S1, S2.  No S3, S4, murmur. 

ABDOMEN:  Soft.  Bowel sounds present.  No tenderness. 

EXTREMITIES:   No edema. 

SKIN:  No rash. 

 

Her blood cultures have been negative.

IMPRESSION:  

1. Aspiration pneumonia, resolved, status post 5 days of IV antibiotic.  Can discontinue

antibiotic. 

2. Bacteria colonization with extended-spectrum beta-lactamase in a patient with chronic

Lai.  The patient can be discharged home. 

3. Aspiration precaution.

4. Hypothyroidism.

5. Debilitated.

6. Anemia of chronic disease.

 

 

 

 

______________________________

MD CELE Santos/JESUS

D:  03/21/2019 17:14:22

T:  03/21/2019 20:22:30

Job #:  [id number]

## 2019-03-21 NOTE — NUR
Spoke to Dr. Bryant, he stated patient does not need any more antibiotics and can be 
transferred back to nursing home.

## 2019-03-21 NOTE — NUR
Notified Dr. Tee that Dr. Bryant cleared patient for discharge/transfer back to nursing 
home and that she does not need any more IV antibiotics.

## 2019-03-22 VITALS — DIASTOLIC BLOOD PRESSURE: 72 MMHG | SYSTOLIC BLOOD PRESSURE: 153 MMHG

## 2019-03-22 VITALS — DIASTOLIC BLOOD PRESSURE: 77 MMHG | SYSTOLIC BLOOD PRESSURE: 142 MMHG

## 2019-03-22 VITALS — SYSTOLIC BLOOD PRESSURE: 142 MMHG | DIASTOLIC BLOOD PRESSURE: 77 MMHG

## 2019-03-22 VITALS — SYSTOLIC BLOOD PRESSURE: 166 MMHG | DIASTOLIC BLOOD PRESSURE: 72 MMHG

## 2019-03-22 VITALS — SYSTOLIC BLOOD PRESSURE: 140 MMHG | DIASTOLIC BLOOD PRESSURE: 66 MMHG

## 2019-03-22 VITALS — DIASTOLIC BLOOD PRESSURE: 69 MMHG | SYSTOLIC BLOOD PRESSURE: 116 MMHG

## 2019-03-22 RX ADMIN — CASTOR OIL AND BALSAM, PERU SCH GM: 788; 87 OINTMENT TOPICAL at 09:00

## 2019-03-22 RX ADMIN — LEVALBUTEROL HYDROCHLORIDE SCH MG: 0.63 SOLUTION RESPIRATORY (INHALATION) at 13:00

## 2019-03-22 RX ADMIN — METOPROLOL TARTRATE SCH MG: 25 TABLET, FILM COATED ORAL at 09:00

## 2019-03-22 RX ADMIN — LEVALBUTEROL HYDROCHLORIDE SCH MG: 0.63 SOLUTION RESPIRATORY (INHALATION) at 07:00

## 2019-03-22 RX ADMIN — FUROSEMIDE SCH MG: 20 TABLET ORAL at 09:00

## 2019-03-22 RX ADMIN — Medication SCH ML: at 09:00

## 2019-03-22 RX ADMIN — LEVALBUTEROL HYDROCHLORIDE SCH MG: 0.63 SOLUTION RESPIRATORY (INHALATION) at 00:19

## 2019-03-22 RX ADMIN — Medication SCH MG: at 09:00

## 2019-03-22 RX ADMIN — LEVOTHYROXINE SODIUM SCH MCG: 88 TABLET ORAL at 05:19

## 2019-03-22 NOTE — NUR
Nutrition Intervention Note



RD Recommendation(s) for Physician: 

- Continue current TF order

- Check labs, daily weight and GI tolerance 



Plan of Care: RD following, monitoring for tolerance and adequacy, TF rec  



Nutrition reason for involvement: Follow up 



RD Assessment

3/22: Follow up. Pt was discussed during AM rounds. Repeat MBS showed severe dysphagia with 
overt aspiration of multiple consistencies. Possible discharge after SLP see pt. Per RN 
Pamela, pt was tolerating Osmolite 1.2 @ goal rate of 55mL/hr well. No gastric residual 
noted. Will continue to monitor and follow. 



3/18: Follow up. Pt discussed during am rounds. Pt pulled out PEG this am, MD aware per RN, 
and plan for EGD and replacement today or tomorrow with addition of abdominal binder. Pt 
previously tolerating TF at goal rate per RN prior to pt pulling out PEG. No SLP evaluation 
noted per chart. Chart reviewed. Pt now on D5NS at 75 ml/hr. Will monitor and continue to 
follow. 



3/13  Chart reviewed. 84yo F, who was admitted for aspiration PNA. Pt came from a nursing 
home with PEG tube POA. Per RN Radha, pt ate 50% of her oatmeal and was coughing after 
drinking milk this AM. Pt was able to tolerate soft foods alright. Per son, pt was on a 
pureed diet with Ensure at the nursing home. Pt also got supplemental EN (bolus feeding of 
4x 250cc Fibersource) through PEG at night time if her PO intake during the day was low.   
No GI complains noted. Per son, pt was having significant weight loss due to ill-fitting 
denture about 8 months ago. Since the denture was removed, pt has been maintaining her 
weight at 110lb. SLP has been consulted. Spoke to Dr. Tee regarding RD recommendation. Dr. Tee agreed with plan; order has been placed. Will continue to monitor and follow. 



Principal Problems/Diagnoses: Aspiration PNA 



PMH: HTN, MI, CVA, hypothyroidism, dementia, stroke, CVA w/ R side weakness



GI: abdomen soft, LBM  3/22



Skin: Stage II PU to back  



Labs: 

3/21: Glucose 167 H 

3/17: Gluc 128, Ca 8.3



Meds: MVI, lasix, synthroid, xarelto, dextrose



Ht: 63 in (approximation)           

Wt: 113.31lb          

BMI: 20kg/m2          

IBW: 115lb      



Malnutrition Evaluation (3/13/2019)

The patient does not meet criteria for a specified degree of malnutrition at this time. Will 
re-evaluate at follow-up as appropriate. 



Nutrition Prescription (Diet Order): TF Osmolite 1.2 at 55 ml/hr  



Estimated Nutritional Needs:

Calories:  1300  1820kcal      (25-35kcal/kg/d) Weight used: Current BW 

Protein:  52  78g      (1-1.5g/kg/d) Weight used: Current BW



Diet Adequacy:

meeting calorie needs, meeting protein needs



Diet Education Needs Assessment:

Diet education not indicated; pt on TF. 



Nutrition Care Level: low (stable TF)



Nutrition Diagnosis: Inadequate oral intake related to chronic illness as evidenced by pt 
requiring EN as main source of nutrition.  



Goal: Patient will meet % of estimated needs by follow up 



Progress: Goal met 



Interventions:

Composition, Rate, Route, Collaboration with other providers



Monitoring/Evaluation:

Total energy intake, Total protein intake, Formula/Solution, Labs, GI tolerance, Weight 
change





Signed: Isabelle Portillo, MS,RD,LD

## 2019-03-22 NOTE — NUR
RCD PT AT BED PT IS CONFUSED ASSESSMENT DONE  PT IN OSMOLYTE 55 ML BY TUBE FEED  IV PATENT  
PT RESTING ON BED NO SIGNS OF ANY DISTRESS NOTED BED LOW AND LOCKED CALL LIGHT IN REACH

## 2019-03-22 NOTE — NUR
SPOKE WITH SON HUGO (POA) ABOUT TRANSFER TO [facility], GAVE VERBAL CONSENT VIA PHONE AND 
STATES DOES NOT CARE WHAT AMBULANCE TRANSPORTS. SPOKE WITH KATHY SON ON SITE, NOTIFYING HIM 
OF ACCEPTANCE AND TRANSFER TO FACILITY, HE AGREED.

## 2019-03-22 NOTE — NUR
CM to bedside to discuss [facility] patients rights. Patient with cognitive limitation. IMM 
discussed with patient's son and verbal acknowledgement given by son via telephone and 
witnessed by Lavern RN. Mr. Jarquin (son) verbalized understanding of discussion. Copy of IMM 
on chart and at the bedside.

## 2019-03-22 NOTE — DIAGNOSTIC IMAGING REPORT
EXAM: Modified barium swallow

INDICATION: Aspiration, CVA

COMPARISON: None



FINDINGS:



This examination was conducted in conjunction with speech pathologist.  Patient

was given, by mouth, liquids and solids of various consistencies.



Examination showed premature spillage over the base of the tongue, vallecula

and piriform sinuses with all consistencies.

Minimal to adelaide overt aspiration of thin liquids during the swallow was noted

adelaide overt aspiration of thick puree was noted after the swallow.

Moderate to severe vallecular and piriform sinus residue was noted after

swallows of all consistencies.





Fluoro time: 2.4 minutes



IMPRESSION:



<Severe dysphagia with overt aspiration of multiple consistencies. Please see

speech pathology report for detailed description and recommendations.>



Signed by: Dr. Yadiel Conklin M.D. on 3/22/2019 4:48 AM

## 2019-03-23 NOTE — DISCHARGE SUMMARY
FINAL DISCHARGE DIAGNOSES:  

1. Sepsis secondary to urinary tract infection with underlying aspiration pneumonia with

improved fever and white count now.  We would continue with no more antibiotic. 

2. Urinary tract infection and extended-spectrum beta-lactamase.

3. Aspiration pneumonitis.

4. Moderate protein-calorie malnutrition, status post percutaneous endoscopic

gastrostomy tube replaced on 03/19/2019, by GI, tolerating feeds. 

5. Baseline dementia.

6. Atrial fibrillation __________.

7. Cerebrovascular accident in the past.

 

PHYSICAL EXAMINATION:

VITAL SIGNS:  Temperature 96.8, pulse 90, respiratory rate is 18, blood pressure 142/77,

and pulse ox 98% on 2 L nasal cannula. 

LABORATORY DATA:  Lab findings show white count 5.1, hemoglobin 11.2, hematocrit 36,

platelets of 328.  Chemistry; sodium 136, potassium 4, chloride 102, bicarb 28, anion

gap of 10, BUN of 17, creatinine of 0.69, glucose is 167, calcium is 9.3.  Troponins

were all negative.  Urinalysis is concerning for underlying UTI.  Urine culture positive

for ESBL E coli.  Sputum culture, Pseudomonas ESBL E coli.  Blood cultures were

negative.  Repeat blood cultures were negative as well so, 4/4 were negative. 

 

IMAGING STUDIES:  Chest x-ray on admission shows concern for underlying pneumonia. 

 

Brain CT was found to be negative for any acute findings. 

 

Chest x-ray on 03/16/2019, shows mild bibasilar linear opacification developing

__________ excluded. 

 

Modified-barium swallow, the patient failed on 2 occasions.

 

HOSPITAL COURSE:  This is an 83-year-old  female, who lives in a nursing home,

bedbound from a history of CVA in the past, PEG tube fed, was on a pureed diet at the

nursing home, comes in as a transfer from an outside nursing home with encephalopathy

and fever.  The patient was found to have a positive sputum culture and urinary tract

infection, requiring IV antibiotics.  ID was consulted.  Sputum culture, urine culture,

and blood culture were noted above.  The patient maintained on IV antibiotics while here

in the hospital.  During the hospital course, she did develop a repeat fever, in which

antibiotics were adjusted accordingly.  The patient improved, her white count was

normal, and she was afebrile prior to being discharged.  She did have some concerns for

underlying aspiration pneumonia and also the UTI leading to the etiology of her

infection.  While here in the hospital, the patient pulled her PEG tube in the middle of

the night, requiring a GI consultation.  The patient had her PEG tube replaced on

03/19/2019, by GI.  The patient tolerated tube feeds well.  On discharge, the patient

was back to normal baseline with no other complaints.  She has been cleared by both

consultants, GI and ID for discharge home.  All antibiotics have been discontinued, no

need for antibiotics on discharge.  On the day of discharge, vital signs were stable,

labs have been stable.  The patient seen, evaluated, and examined thoroughly on the day

of discharge, no other complaints.  The patient and the family verbalized understanding

and agreed with plan of care to follow up as an outpatient with the primary care

physician in 1 week time.  Once again, the patient is back to her normal baseline.  I

discussed the plan of care with the son, who is currently at bedside at this moment and

he verbalized understanding. 

 

MEDICATIONS:  See med reconciliation form.

 

DISPOSITION:  To home.

 

CONDITION:  Stable.

 

DIET:  Tube feeds. 

 

In the event of any worsening symptoms, the patient advised to come back to the ED for

further evaluation. 

 

Discharge summary took greater than 35 minutes.

 

 

 

 

______________________________

MD NURA [facility]/JESUS

D:  03/22/2019 11:19:39

T:  03/23/2019 03:27:15

Job #:  [id number]

## 2024-03-06 NOTE — DIAGNOSTIC IMAGING REPORT
PROCEDURE:X-RAY MODIFIED BARIUM SWALLOW

 

COMPARISON:None.

 

INDICATIONS:Dysphasia with dementia

 

DISCUSSION:Fluoroscopic examination was performed in conjunction with 

speech pathology, during swallowing of a variety of thin and thick 

liquid consistencies.

 

Fluoroscopy time: 5.0 minutes.

Cumulative dose: 22.8 mGy

 

CONCLUSION:There is penetration into the laryngeal vestibule. Adam 

aspiration also noted. Please see the report from speech pathology for 

complete details.

 

 

 

Eduardo Obregon D.O.  

Dictated by:  Eduardo Obregon D.O. on 3/15/2019 at 7:48     

Electronically approved by:  Eduardo Obregon D.O. on 3/15/2019 at 7:48 06-Mar-2024 18:30